# Patient Record
Sex: FEMALE | Race: WHITE | Employment: OTHER | URBAN - METROPOLITAN AREA
[De-identification: names, ages, dates, MRNs, and addresses within clinical notes are randomized per-mention and may not be internally consistent; named-entity substitution may affect disease eponyms.]

---

## 2017-06-09 PROBLEM — C50.919 BREAST CANCER (HCC): Status: ACTIVE | Noted: 2017-06-09

## 2017-06-09 PROBLEM — R42 DIZZINESS: Status: ACTIVE | Noted: 2017-06-09

## 2018-05-04 ENCOUNTER — HOSPITAL ENCOUNTER (OUTPATIENT)
Age: 63
Discharge: HOME | End: 2018-05-04
Payer: COMMERCIAL

## 2018-05-04 DIAGNOSIS — C50.912 STAGE 1 BREAST CANCER, ER+, LEFT (HCC): Primary | ICD-10-CM

## 2018-05-04 DIAGNOSIS — Z17.0 STAGE 1 BREAST CANCER, ER+, LEFT (HCC): ICD-10-CM

## 2018-05-04 DIAGNOSIS — Z17.0 STAGE 1 BREAST CANCER, ER+, LEFT (HCC): Primary | ICD-10-CM

## 2018-05-04 DIAGNOSIS — C50.912 STAGE 1 BREAST CANCER, ER+, LEFT (HCC): ICD-10-CM

## 2018-05-04 LAB
ALBUMIN SERPL-MCNC: 4.1 G/DL (ref 3.5–5.2)
ALP BLD-CCNC: 55 U/L (ref 35–104)
ALT SERPL-CCNC: 18 U/L (ref 0–32)
ANION GAP SERPL CALCULATED.3IONS-SCNC: 11 MMOL/L (ref 7–16)
AST SERPL-CCNC: 21 U/L (ref 0–31)
BASOPHILS ABSOLUTE: 0.05 E9/L (ref 0–0.2)
BASOPHILS RELATIVE PERCENT: 0.8 % (ref 0–2)
BILIRUB SERPL-MCNC: 0.6 MG/DL (ref 0–1.2)
BUN BLDV-MCNC: 16 MG/DL (ref 8–23)
CALCIUM SERPL-MCNC: 9.1 MG/DL (ref 8.6–10.2)
CHLORIDE BLD-SCNC: 103 MMOL/L (ref 98–107)
CHOLESTEROL, TOTAL: 235 MG/DL (ref 0–199)
CO2: 27 MMOL/L (ref 22–29)
CREAT SERPL-MCNC: 0.8 MG/DL (ref 0.5–1)
EOSINOPHILS ABSOLUTE: 0.16 E9/L (ref 0.05–0.5)
EOSINOPHILS RELATIVE PERCENT: 2.7 % (ref 0–6)
GFR AFRICAN AMERICAN: >60
GFR NON-AFRICAN AMERICAN: >60 ML/MIN/1.73
GLUCOSE BLD-MCNC: 91 MG/DL (ref 74–109)
HCT VFR BLD CALC: 39 % (ref 34–48)
HDLC SERPL-MCNC: 79 MG/DL
HEMOGLOBIN: 13 G/DL (ref 11.5–15.5)
IMMATURE GRANULOCYTES #: 0.02 E9/L
IMMATURE GRANULOCYTES %: 0.3 % (ref 0–5)
LDL CHOLESTEROL CALCULATED: 140 MG/DL (ref 0–99)
LYMPHOCYTES ABSOLUTE: 1.24 E9/L (ref 1.5–4)
LYMPHOCYTES RELATIVE PERCENT: 20.7 % (ref 20–42)
MCH RBC QN AUTO: 31 PG (ref 26–35)
MCHC RBC AUTO-ENTMCNC: 33.3 % (ref 32–34.5)
MCV RBC AUTO: 92.9 FL (ref 80–99.9)
MONOCYTES ABSOLUTE: 0.54 E9/L (ref 0.1–0.95)
MONOCYTES RELATIVE PERCENT: 9 % (ref 2–12)
NEUTROPHILS ABSOLUTE: 3.97 E9/L (ref 1.8–7.3)
NEUTROPHILS RELATIVE PERCENT: 66.5 % (ref 43–80)
PDW BLD-RTO: 12.3 FL (ref 11.5–15)
PLATELET # BLD: 193 E9/L (ref 130–450)
PMV BLD AUTO: 10.5 FL (ref 7–12)
POTASSIUM SERPL-SCNC: 4 MMOL/L (ref 3.5–5)
RBC # BLD: 4.2 E12/L (ref 3.5–5.5)
SODIUM BLD-SCNC: 141 MMOL/L (ref 132–146)
T4 TOTAL: 6.9 MCG/DL (ref 4.5–11.7)
TOTAL PROTEIN: 6.8 G/DL (ref 6.4–8.3)
TRIGL SERPL-MCNC: 78 MG/DL (ref 0–149)
TSH SERPL DL<=0.05 MIU/L-ACNC: 1.15 UIU/ML (ref 0.27–4.2)
VITAMIN D 25-HYDROXY: 36 NG/ML (ref 30–100)
VLDLC SERPL CALC-MCNC: 16 MG/DL
WBC # BLD: 6 E9/L (ref 4.5–11.5)

## 2018-05-04 PROCEDURE — 85025 COMPLETE CBC W/AUTO DIFF WBC: CPT

## 2018-05-04 PROCEDURE — 80061 LIPID PANEL: CPT

## 2018-05-04 PROCEDURE — 84443 ASSAY THYROID STIM HORMONE: CPT

## 2018-05-04 PROCEDURE — 36415 COLL VENOUS BLD VENIPUNCTURE: CPT

## 2018-05-04 PROCEDURE — 82306 VITAMIN D 25 HYDROXY: CPT

## 2018-05-04 PROCEDURE — 80053 COMPREHEN METABOLIC PANEL: CPT

## 2018-05-04 PROCEDURE — 84436 ASSAY OF TOTAL THYROXINE: CPT

## 2018-05-04 PROCEDURE — 86300 IMMUNOASSAY TUMOR CA 15-3: CPT

## 2018-05-05 LAB — CA 15-3: 10 U/ML (ref 0–31)

## 2018-05-15 DIAGNOSIS — I72.8 SPLENIC ARTERY ANEURYSM (HCC): Primary | ICD-10-CM

## 2018-05-16 ENCOUNTER — OFFICE VISIT (OUTPATIENT)
Dept: ONCOLOGY | Age: 63
End: 2018-05-16
Payer: COMMERCIAL

## 2018-05-16 ENCOUNTER — HOSPITAL ENCOUNTER (OUTPATIENT)
Dept: INFUSION THERAPY | Age: 63
Discharge: HOME | End: 2018-05-16
Payer: COMMERCIAL

## 2018-05-16 VITALS
HEIGHT: 63 IN | HEART RATE: 55 BPM | BODY MASS INDEX: 30.72 KG/M2 | TEMPERATURE: 97.8 F | WEIGHT: 173.4 LBS | RESPIRATION RATE: 16 BRPM | DIASTOLIC BLOOD PRESSURE: 75 MMHG | SYSTOLIC BLOOD PRESSURE: 157 MMHG

## 2018-05-16 DIAGNOSIS — C50.912 STAGE 1 BREAST CANCER, ER+, LEFT (HCC): Primary | ICD-10-CM

## 2018-05-16 DIAGNOSIS — Z17.0 STAGE 1 BREAST CANCER, ER+, LEFT (HCC): Primary | ICD-10-CM

## 2018-05-16 PROCEDURE — 99213 OFFICE O/P EST LOW 20 MIN: CPT | Performed by: INTERNAL MEDICINE

## 2018-05-19 ENCOUNTER — HOSPITAL ENCOUNTER (OUTPATIENT)
Dept: CT IMAGING | Age: 63
Discharge: HOME | End: 2018-05-19
Payer: COMMERCIAL

## 2018-05-19 DIAGNOSIS — I72.8 SPLENIC ARTERY ANEURYSM (HCC): ICD-10-CM

## 2018-05-19 PROCEDURE — 74150 CT ABDOMEN W/O CONTRAST: CPT

## 2018-05-22 ENCOUNTER — OFFICE VISIT (OUTPATIENT)
Dept: VASCULAR SURGERY | Age: 63
End: 2018-05-22
Payer: COMMERCIAL

## 2018-05-22 DIAGNOSIS — I72.8 SPLENIC ARTERY ANEURYSM (HCC): Primary | ICD-10-CM

## 2018-05-22 PROCEDURE — 99213 OFFICE O/P EST LOW 20 MIN: CPT | Performed by: SURGERY

## 2018-05-22 RX ORDER — FUROSEMIDE 20 MG/1
20 TABLET ORAL PRN
COMMUNITY
Start: 2018-05-04 | End: 2021-12-07 | Stop reason: SDUPTHER

## 2018-06-12 ENCOUNTER — HOSPITAL ENCOUNTER (OUTPATIENT)
Dept: NON INVASIVE DIAGNOSTICS | Age: 63
Discharge: HOME | End: 2018-06-12
Payer: COMMERCIAL

## 2018-06-12 LAB
LV EF: 55 %
LVEF MODALITY: NORMAL

## 2018-06-12 PROCEDURE — 93306 TTE W/DOPPLER COMPLETE: CPT

## 2018-07-02 ENCOUNTER — HOSPITAL ENCOUNTER (OUTPATIENT)
Age: 63
Discharge: HOME | End: 2018-07-02
Payer: COMMERCIAL

## 2018-07-02 LAB
ANION GAP SERPL CALCULATED.3IONS-SCNC: 8 MMOL/L (ref 7–16)
BUN BLDV-MCNC: 18 MG/DL (ref 8–23)
CALCIUM SERPL-MCNC: 8.7 MG/DL (ref 8.6–10.2)
CHLORIDE BLD-SCNC: 105 MMOL/L (ref 98–107)
CO2: 28 MMOL/L (ref 22–29)
CREAT SERPL-MCNC: 0.8 MG/DL (ref 0.5–1)
GFR AFRICAN AMERICAN: >60
GFR NON-AFRICAN AMERICAN: >60 ML/MIN/1.73
GLUCOSE BLD-MCNC: 100 MG/DL (ref 74–109)
POTASSIUM SERPL-SCNC: 4.1 MMOL/L (ref 3.5–5)
SODIUM BLD-SCNC: 141 MMOL/L (ref 132–146)

## 2018-07-02 PROCEDURE — 36415 COLL VENOUS BLD VENIPUNCTURE: CPT

## 2018-07-02 PROCEDURE — 80048 BASIC METABOLIC PNL TOTAL CA: CPT

## 2018-11-15 ENCOUNTER — HOSPITAL ENCOUNTER (OUTPATIENT)
Age: 63
Discharge: HOME | End: 2018-11-15
Payer: COMMERCIAL

## 2018-11-15 LAB
ALBUMIN SERPL-MCNC: 4.3 G/DL (ref 3.5–5.2)
ALP BLD-CCNC: 50 U/L (ref 35–104)
ALT SERPL-CCNC: 15 U/L (ref 0–32)
ANION GAP SERPL CALCULATED.3IONS-SCNC: 12 MMOL/L (ref 7–16)
AST SERPL-CCNC: 20 U/L (ref 0–31)
BILIRUB SERPL-MCNC: 0.7 MG/DL (ref 0–1.2)
BUN BLDV-MCNC: 18 MG/DL (ref 8–23)
CALCIUM SERPL-MCNC: 9.1 MG/DL (ref 8.6–10.2)
CHLORIDE BLD-SCNC: 103 MMOL/L (ref 98–107)
CHOLESTEROL, TOTAL: 274 MG/DL (ref 0–199)
CO2: 26 MMOL/L (ref 22–29)
CREAT SERPL-MCNC: 0.8 MG/DL (ref 0.5–1)
GFR AFRICAN AMERICAN: >60
GFR NON-AFRICAN AMERICAN: >60 ML/MIN/1.73
GLUCOSE BLD-MCNC: 94 MG/DL (ref 74–99)
HCT VFR BLD CALC: 38 % (ref 34–48)
HDLC SERPL-MCNC: 87 MG/DL
HEMOGLOBIN: 13 G/DL (ref 11.5–15.5)
LDL CHOLESTEROL CALCULATED: 175 MG/DL (ref 0–99)
MCH RBC QN AUTO: 31.3 PG (ref 26–35)
MCHC RBC AUTO-ENTMCNC: 34.2 % (ref 32–34.5)
MCV RBC AUTO: 91.6 FL (ref 80–99.9)
PDW BLD-RTO: 12.2 FL (ref 11.5–15)
PLATELET # BLD: 198 E9/L (ref 130–450)
PMV BLD AUTO: 10.5 FL (ref 7–12)
POTASSIUM SERPL-SCNC: 4.3 MMOL/L (ref 3.5–5)
RBC # BLD: 4.15 E12/L (ref 3.5–5.5)
SODIUM BLD-SCNC: 141 MMOL/L (ref 132–146)
TOTAL PROTEIN: 7 G/DL (ref 6.4–8.3)
TRIGL SERPL-MCNC: 62 MG/DL (ref 0–149)
TSH SERPL DL<=0.05 MIU/L-ACNC: 1.21 UIU/ML (ref 0.27–4.2)
VITAMIN D 25-HYDROXY: 41 NG/ML (ref 30–100)
VLDLC SERPL CALC-MCNC: 12 MG/DL
WBC # BLD: 5.9 E9/L (ref 4.5–11.5)

## 2018-11-15 PROCEDURE — 36415 COLL VENOUS BLD VENIPUNCTURE: CPT

## 2018-11-15 PROCEDURE — 85027 COMPLETE CBC AUTOMATED: CPT

## 2018-11-15 PROCEDURE — 84443 ASSAY THYROID STIM HORMONE: CPT

## 2018-11-15 PROCEDURE — 80061 LIPID PANEL: CPT

## 2018-11-15 PROCEDURE — 82306 VITAMIN D 25 HYDROXY: CPT

## 2018-11-15 PROCEDURE — 80053 COMPREHEN METABOLIC PANEL: CPT

## 2018-12-10 ENCOUNTER — HOSPITAL ENCOUNTER (OUTPATIENT)
Dept: GENERAL RADIOLOGY | Age: 63
Discharge: HOME | End: 2018-12-12
Payer: COMMERCIAL

## 2018-12-10 DIAGNOSIS — C50.912 STAGE 1 BREAST CANCER, ER+, LEFT (HCC): ICD-10-CM

## 2018-12-10 DIAGNOSIS — Z17.0 STAGE 1 BREAST CANCER, ER+, LEFT (HCC): ICD-10-CM

## 2018-12-10 PROCEDURE — 77067 SCR MAMMO BI INCL CAD: CPT

## 2018-12-10 PROCEDURE — 77080 DXA BONE DENSITY AXIAL: CPT

## 2019-05-09 ENCOUNTER — HOSPITAL ENCOUNTER (OUTPATIENT)
Age: 64
Discharge: HOME | End: 2019-05-09
Payer: COMMERCIAL

## 2019-05-09 LAB
ALBUMIN SERPL-MCNC: 3.8 G/DL (ref 3.5–5.2)
ALP BLD-CCNC: 53 U/L (ref 35–104)
ALT SERPL-CCNC: 10 U/L (ref 0–32)
ANION GAP SERPL CALCULATED.3IONS-SCNC: 8 MMOL/L (ref 7–16)
AST SERPL-CCNC: 19 U/L (ref 0–31)
BASOPHILS ABSOLUTE: 0.05 E9/L (ref 0–0.2)
BASOPHILS RELATIVE PERCENT: 1.1 % (ref 0–2)
BILIRUB SERPL-MCNC: 0.5 MG/DL (ref 0–1.2)
BUN BLDV-MCNC: 19 MG/DL (ref 8–23)
CALCIUM SERPL-MCNC: 9 MG/DL (ref 8.6–10.2)
CHLORIDE BLD-SCNC: 105 MMOL/L (ref 98–107)
CHOLESTEROL, TOTAL: 237 MG/DL (ref 0–199)
CO2: 28 MMOL/L (ref 22–29)
CREAT SERPL-MCNC: 0.9 MG/DL (ref 0.5–1)
EOSINOPHILS ABSOLUTE: 0.15 E9/L (ref 0.05–0.5)
EOSINOPHILS RELATIVE PERCENT: 3.2 % (ref 0–6)
GFR AFRICAN AMERICAN: >60
GFR NON-AFRICAN AMERICAN: >60 ML/MIN/1.73
GLUCOSE BLD-MCNC: 108 MG/DL (ref 74–99)
HCT VFR BLD CALC: 40 % (ref 34–48)
HDLC SERPL-MCNC: 74 MG/DL
HEMOGLOBIN: 13.2 G/DL (ref 11.5–15.5)
IMMATURE GRANULOCYTES #: 0.02 E9/L
IMMATURE GRANULOCYTES %: 0.4 % (ref 0–5)
LDL CHOLESTEROL CALCULATED: 152 MG/DL (ref 0–99)
LYMPHOCYTES ABSOLUTE: 1.17 E9/L (ref 1.5–4)
LYMPHOCYTES RELATIVE PERCENT: 25.3 % (ref 20–42)
MCH RBC QN AUTO: 30.6 PG (ref 26–35)
MCHC RBC AUTO-ENTMCNC: 33 % (ref 32–34.5)
MCV RBC AUTO: 92.8 FL (ref 80–99.9)
MONOCYTES ABSOLUTE: 0.44 E9/L (ref 0.1–0.95)
MONOCYTES RELATIVE PERCENT: 9.5 % (ref 2–12)
NEUTROPHILS ABSOLUTE: 2.8 E9/L (ref 1.8–7.3)
NEUTROPHILS RELATIVE PERCENT: 60.5 % (ref 43–80)
PDW BLD-RTO: 12.5 FL (ref 11.5–15)
PLATELET # BLD: 172 E9/L (ref 130–450)
PMV BLD AUTO: 10.8 FL (ref 7–12)
POTASSIUM SERPL-SCNC: 4.8 MMOL/L (ref 3.5–5)
RBC # BLD: 4.31 E12/L (ref 3.5–5.5)
SODIUM BLD-SCNC: 141 MMOL/L (ref 132–146)
TOTAL PROTEIN: 6.4 G/DL (ref 6.4–8.3)
TRIGL SERPL-MCNC: 55 MG/DL (ref 0–149)
TSH SERPL DL<=0.05 MIU/L-ACNC: 1.17 UIU/ML (ref 0.27–4.2)
VITAMIN D 25-HYDROXY: 40 NG/ML (ref 30–100)
VLDLC SERPL CALC-MCNC: 11 MG/DL
WBC # BLD: 4.6 E9/L (ref 4.5–11.5)

## 2019-05-09 PROCEDURE — 80061 LIPID PANEL: CPT

## 2019-05-09 PROCEDURE — 82306 VITAMIN D 25 HYDROXY: CPT

## 2019-05-09 PROCEDURE — 84443 ASSAY THYROID STIM HORMONE: CPT

## 2019-05-09 PROCEDURE — 80053 COMPREHEN METABOLIC PANEL: CPT

## 2019-05-09 PROCEDURE — 85025 COMPLETE CBC W/AUTO DIFF WBC: CPT

## 2019-05-09 PROCEDURE — 36415 COLL VENOUS BLD VENIPUNCTURE: CPT

## 2019-05-17 NOTE — PROGRESS NOTES
81 MG EC tablet Take 81 mg by mouth daily    Historical Provider, MD   celecoxib (CELEBREX) 100 MG capsule Take 200 mg by mouth 2 times daily as needed. Historical Provider, MD   calcium carbonate (OSCAL) 500 MG TABS tablet Take 500 mg by mouth daily. Historical Provider, MD    Scheduled Meds:  Continuous Infusions:  PRN Meds:.        Recent Laboratory Data-     Lab Results   Component Value Date    WBC 4.6 05/09/2019    HGB 13.2 05/09/2019    HCT 40.0 05/09/2019    MCV 92.8 05/09/2019     05/09/2019    LYMPHOPCT 25.3 05/09/2019    RBC 4.31 05/09/2019    MCH 30.6 05/09/2019    MCHC 33.0 05/09/2019    RDW 12.5 05/09/2019    NEUTOPHILPCT 60.5 05/09/2019    MONOPCT 9.5 05/09/2019    BASOPCT 1.1 05/09/2019    NEUTROABS 2.80 05/09/2019    LYMPHSABS 1.17 (L) 05/09/2019    MONOSABS 0.44 05/09/2019    EOSABS 0.15 05/09/2019    BASOSABS 0.05 05/09/2019       Lab Results   Component Value Date     05/09/2019    K 4.8 05/09/2019     05/09/2019    CO2 28 05/09/2019    BUN 19 05/09/2019    CREATININE 0.9 05/09/2019    GLUCOSE 108 (H) 05/09/2019    CALCIUM 9.0 05/09/2019    PROT 6.4 05/09/2019    LABALBU 3.8 05/09/2019    BILITOT 0.5 05/09/2019    ALKPHOS 53 05/09/2019    AST 19 05/09/2019    ALT 10 05/09/2019    LABGLOM >60 05/09/2019    GFRAA >60 05/09/2019         Lab Results   Component Value Date     4.4 01/17/2011         Radiology-  BILATERAL SCREENING MAMMOGRAM:  12/10/18  IMPRESSION:   No mammographic evidence of malignancy.       Screening mammogram in 1 year is recommended.       =======================================   BI-RADS Category 1:  Negative         DEXA BONE DENSITY:  12/10/18  FINDINGS: The lumbar spine from L1-L4 demonstrates a bone mineral   density of 0.988 g/sq cm corresponding to a T score of -1.6 and a Z   score of -0.6.  According to the [address] [facility] definition   of osteoporosis and osteopenia, this is considered osteopenia.       No comparison.       The

## 2019-05-20 ENCOUNTER — OFFICE VISIT (OUTPATIENT)
Dept: ONCOLOGY | Age: 64
End: 2019-05-20
Payer: COMMERCIAL

## 2019-05-20 ENCOUNTER — HOSPITAL ENCOUNTER (OUTPATIENT)
Dept: INFUSION THERAPY | Age: 64
Discharge: HOME | End: 2019-05-20
Payer: COMMERCIAL

## 2019-05-20 VITALS
HEIGHT: 63 IN | DIASTOLIC BLOOD PRESSURE: 75 MMHG | BODY MASS INDEX: 30.44 KG/M2 | TEMPERATURE: 97.5 F | WEIGHT: 171.8 LBS | HEART RATE: 67 BPM | SYSTOLIC BLOOD PRESSURE: 155 MMHG

## 2019-05-20 DIAGNOSIS — C50.912 STAGE 1 BREAST CANCER, ER+, LEFT (HCC): Primary | ICD-10-CM

## 2019-05-20 DIAGNOSIS — Z17.0 STAGE 1 BREAST CANCER, ER+, LEFT (HCC): Primary | ICD-10-CM

## 2019-05-20 PROCEDURE — 99213 OFFICE O/P EST LOW 20 MIN: CPT | Performed by: INTERNAL MEDICINE

## 2019-05-20 RX ORDER — LISINOPRIL 2.5 MG/1
2.5 TABLET ORAL DAILY
COMMUNITY
End: 2022-01-10

## 2019-07-31 ENCOUNTER — HOSPITAL ENCOUNTER (OUTPATIENT)
Dept: NON INVASIVE DIAGNOSTICS | Age: 64
Discharge: HOME | End: 2019-07-31
Payer: COMMERCIAL

## 2019-07-31 LAB
LV EF: 68 %
LVEF MODALITY: NORMAL

## 2019-07-31 PROCEDURE — 93306 TTE W/DOPPLER COMPLETE: CPT

## 2019-11-08 DIAGNOSIS — Z79.811 USE OF ANASTROZOLE (ARIMIDEX): Primary | ICD-10-CM

## 2019-11-23 ENCOUNTER — HOSPITAL ENCOUNTER (OUTPATIENT)
Age: 64
Discharge: HOME | End: 2019-11-23
Payer: COMMERCIAL

## 2019-11-23 LAB
ALBUMIN SERPL-MCNC: 4.2 G/DL (ref 3.5–5.2)
ALP BLD-CCNC: 56 U/L (ref 35–104)
ALT SERPL-CCNC: 19 U/L (ref 0–32)
ANION GAP SERPL CALCULATED.3IONS-SCNC: 10 MMOL/L (ref 7–16)
AST SERPL-CCNC: 25 U/L (ref 0–31)
BASOPHILS ABSOLUTE: 0.04 E9/L (ref 0–0.2)
BASOPHILS RELATIVE PERCENT: 0.8 % (ref 0–2)
BILIRUB SERPL-MCNC: 0.6 MG/DL (ref 0–1.2)
BUN BLDV-MCNC: 21 MG/DL (ref 8–23)
C-REACTIVE PROTEIN: 0.5 MG/DL (ref 0–0.4)
CALCIUM SERPL-MCNC: 9.3 MG/DL (ref 8.6–10.2)
CHLORIDE BLD-SCNC: 107 MMOL/L (ref 98–107)
CHOLESTEROL, TOTAL: 225 MG/DL (ref 0–199)
CO2: 25 MMOL/L (ref 22–29)
CREAT SERPL-MCNC: 0.9 MG/DL (ref 0.5–1)
EOSINOPHILS ABSOLUTE: 0.09 E9/L (ref 0.05–0.5)
EOSINOPHILS RELATIVE PERCENT: 1.9 % (ref 0–6)
GFR AFRICAN AMERICAN: >60
GFR NON-AFRICAN AMERICAN: >60 ML/MIN/1.73
GLUCOSE BLD-MCNC: 97 MG/DL (ref 74–99)
HBA1C MFR BLD: 5.1 % (ref 4–5.6)
HCT VFR BLD CALC: 39.1 % (ref 34–48)
HDLC SERPL-MCNC: 83 MG/DL
HEMOGLOBIN: 12.8 G/DL (ref 11.5–15.5)
IMMATURE GRANULOCYTES #: 0.01 E9/L
IMMATURE GRANULOCYTES %: 0.2 % (ref 0–5)
LDL CHOLESTEROL CALCULATED: 133 MG/DL (ref 0–99)
LYMPHOCYTES ABSOLUTE: 1.07 E9/L (ref 1.5–4)
LYMPHOCYTES RELATIVE PERCENT: 22.3 % (ref 20–42)
MCH RBC QN AUTO: 31.1 PG (ref 26–35)
MCHC RBC AUTO-ENTMCNC: 32.7 % (ref 32–34.5)
MCV RBC AUTO: 94.9 FL (ref 80–99.9)
MONOCYTES ABSOLUTE: 0.52 E9/L (ref 0.1–0.95)
MONOCYTES RELATIVE PERCENT: 10.9 % (ref 2–12)
NEUTROPHILS ABSOLUTE: 3.06 E9/L (ref 1.8–7.3)
NEUTROPHILS RELATIVE PERCENT: 63.9 % (ref 43–80)
PDW BLD-RTO: 12.5 FL (ref 11.5–15)
PLATELET # BLD: 179 E9/L (ref 130–450)
PMV BLD AUTO: 10.9 FL (ref 7–12)
POTASSIUM SERPL-SCNC: 4.7 MMOL/L (ref 3.5–5)
RBC # BLD: 4.12 E12/L (ref 3.5–5.5)
RHEUMATOID FACTOR: 10 IU/ML (ref 0–13)
SEDIMENTATION RATE, ERYTHROCYTE: 9 MM/HR (ref 0–20)
SODIUM BLD-SCNC: 142 MMOL/L (ref 132–146)
TOTAL PROTEIN: 6.8 G/DL (ref 6.4–8.3)
TRIGL SERPL-MCNC: 47 MG/DL (ref 0–149)
VITAMIN D 25-HYDROXY: 41 NG/ML (ref 30–100)
VLDLC SERPL CALC-MCNC: 9 MG/DL
WBC # BLD: 4.8 E9/L (ref 4.5–11.5)

## 2019-11-23 PROCEDURE — 86431 RHEUMATOID FACTOR QUANT: CPT

## 2019-11-23 PROCEDURE — 85651 RBC SED RATE NONAUTOMATED: CPT

## 2019-11-23 PROCEDURE — 86140 C-REACTIVE PROTEIN: CPT

## 2019-11-23 PROCEDURE — 80053 COMPREHEN METABOLIC PANEL: CPT

## 2019-11-23 PROCEDURE — 86038 ANTINUCLEAR ANTIBODIES: CPT

## 2019-11-23 PROCEDURE — 80061 LIPID PANEL: CPT

## 2019-11-23 PROCEDURE — 83036 HEMOGLOBIN GLYCOSYLATED A1C: CPT

## 2019-11-23 PROCEDURE — 85025 COMPLETE CBC W/AUTO DIFF WBC: CPT

## 2019-11-23 PROCEDURE — 82306 VITAMIN D 25 HYDROXY: CPT

## 2019-11-23 PROCEDURE — 36415 COLL VENOUS BLD VENIPUNCTURE: CPT

## 2019-11-25 LAB — ANTI-NUCLEAR ANTIBODY (ANA): NEGATIVE

## 2019-12-06 ENCOUNTER — HOSPITAL ENCOUNTER (OUTPATIENT)
Dept: GENERAL RADIOLOGY | Age: 64
Discharge: HOME | End: 2019-12-08
Payer: COMMERCIAL

## 2019-12-06 DIAGNOSIS — C50.912 STAGE 1 BREAST CANCER, ER+, LEFT (HCC): ICD-10-CM

## 2019-12-06 DIAGNOSIS — Z17.0 STAGE 1 BREAST CANCER, ER+, LEFT (HCC): ICD-10-CM

## 2019-12-06 PROCEDURE — 77063 BREAST TOMOSYNTHESIS BI: CPT

## 2020-04-08 ENCOUNTER — TELEPHONE (OUTPATIENT)
Dept: VASCULAR SURGERY | Age: 65
End: 2020-04-08

## 2020-04-30 ENCOUNTER — HOSPITAL ENCOUNTER (OUTPATIENT)
Age: 65
Discharge: HOME | End: 2020-05-02
Payer: COMMERCIAL

## 2020-04-30 PROCEDURE — U0003 INFECTIOUS AGENT DETECTION BY NUCLEIC ACID (DNA OR RNA); SEVERE ACUTE RESPIRATORY SYNDROME CORONAVIRUS 2 (SARS-COV-2) (CORONAVIRUS DISEASE [COVID-19]), AMPLIFIED PROBE TECHNIQUE, MAKING USE OF HIGH THROUGHPUT TECHNOLOGIES AS DESCRIBED BY CMS-2020-01-R: HCPCS

## 2020-05-03 LAB
SARS-COV-2: NOT DETECTED
SOURCE: NORMAL

## 2020-05-07 ENCOUNTER — HOSPITAL ENCOUNTER (OUTPATIENT)
Age: 65
Discharge: HOME | End: 2020-05-09
Payer: COMMERCIAL

## 2020-05-07 PROCEDURE — U0003 INFECTIOUS AGENT DETECTION BY NUCLEIC ACID (DNA OR RNA); SEVERE ACUTE RESPIRATORY SYNDROME CORONAVIRUS 2 (SARS-COV-2) (CORONAVIRUS DISEASE [COVID-19]), AMPLIFIED PROBE TECHNIQUE, MAKING USE OF HIGH THROUGHPUT TECHNOLOGIES AS DESCRIBED BY CMS-2020-01-R: HCPCS

## 2020-05-08 LAB
SARS-COV-2: NOT DETECTED
SOURCE: NORMAL

## 2020-05-13 ENCOUNTER — TELEPHONE (OUTPATIENT)
Dept: ONCOLOGY | Age: 65
End: 2020-05-13

## 2020-05-13 ENCOUNTER — HOSPITAL ENCOUNTER (OUTPATIENT)
Dept: MAMMOGRAPHY | Age: 65
Discharge: HOME | End: 2020-05-15
Payer: COMMERCIAL

## 2020-05-13 PROCEDURE — 77080 DXA BONE DENSITY AXIAL: CPT

## 2020-05-19 ENCOUNTER — VIRTUAL VISIT (OUTPATIENT)
Dept: VASCULAR SURGERY | Age: 65
End: 2020-05-19
Payer: COMMERCIAL

## 2020-05-19 VITALS — HEIGHT: 63 IN | WEIGHT: 169 LBS | BODY MASS INDEX: 29.95 KG/M2

## 2020-05-19 PROCEDURE — 99212 OFFICE O/P EST SF 10 MIN: CPT | Performed by: PHYSICIAN ASSISTANT

## 2020-05-19 NOTE — PROGRESS NOTES
TELEPHONE VISIT    Consent:  He and/or health care decision maker is aware that that he may receive a bill for this telephone service, depending on his insurance coverage, and has provided verbal consent to proceed: Yes    Documentation:  I communicated with the patient and/or health care decision maker about splenic artery aneurysm. Details of this discussion including any medical advice provided: Pt states she is doing well overall. 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 She has no new changes in health or surgeries since last time we saw her. She denies any abd pain or back pain. We will perform her CT in august when she is able to get it. We will call her with those results and arrange fu at that time. I asked her to call sooner with any issues. I affirm this is a Patient Initiated Episode with a Patient who has not had a related appointment within my department in the past 7 days or scheduled within the next 24 hours.     Patient's location: home  Physician  location office   Other people involved in call- none      Total Time: minutes: 5-10 minutes

## 2020-05-20 ENCOUNTER — HOSPITAL ENCOUNTER (OUTPATIENT)
Age: 65
Discharge: HOME | End: 2020-05-20
Payer: COMMERCIAL

## 2020-05-20 LAB
ALBUMIN SERPL-MCNC: 4.5 G/DL (ref 3.5–5.2)
ALP BLD-CCNC: 62 U/L (ref 35–104)
ALT SERPL-CCNC: 20 U/L (ref 0–32)
ANION GAP SERPL CALCULATED.3IONS-SCNC: 9 MMOL/L (ref 7–16)
AST SERPL-CCNC: 25 U/L (ref 0–31)
BILIRUB SERPL-MCNC: 0.6 MG/DL (ref 0–1.2)
BUN BLDV-MCNC: 18 MG/DL (ref 8–23)
CALCIUM SERPL-MCNC: 9.3 MG/DL (ref 8.6–10.2)
CHLORIDE BLD-SCNC: 103 MMOL/L (ref 98–107)
CHOLESTEROL, FASTING: 272 MG/DL (ref 0–199)
CO2: 28 MMOL/L (ref 22–29)
CREAT SERPL-MCNC: 0.9 MG/DL (ref 0.5–1)
GFR AFRICAN AMERICAN: >60
GFR NON-AFRICAN AMERICAN: >60 ML/MIN/1.73
GLUCOSE FASTING: 105 MG/DL (ref 74–99)
HCT VFR BLD CALC: 43.2 % (ref 34–48)
HDLC SERPL-MCNC: 80 MG/DL
HEMOGLOBIN: 13.9 G/DL (ref 11.5–15.5)
LDL CHOLESTEROL CALCULATED: 176 MG/DL (ref 0–99)
MCH RBC QN AUTO: 30.5 PG (ref 26–35)
MCHC RBC AUTO-ENTMCNC: 32.2 % (ref 32–34.5)
MCV RBC AUTO: 94.9 FL (ref 80–99.9)
PDW BLD-RTO: 12.2 FL (ref 11.5–15)
PLATELET # BLD: 196 E9/L (ref 130–450)
PMV BLD AUTO: 10.2 FL (ref 7–12)
POTASSIUM SERPL-SCNC: 4 MMOL/L (ref 3.5–5)
RBC # BLD: 4.55 E12/L (ref 3.5–5.5)
SODIUM BLD-SCNC: 140 MMOL/L (ref 132–146)
TOTAL PROTEIN: 7.4 G/DL (ref 6.4–8.3)
TRIGLYCERIDE, FASTING: 78 MG/DL (ref 0–149)
TSH SERPL DL<=0.05 MIU/L-ACNC: 1.3 UIU/ML (ref 0.27–4.2)
VITAMIN D 25-HYDROXY: 37 NG/ML (ref 30–100)
VLDLC SERPL CALC-MCNC: 16 MG/DL
WBC # BLD: 5.7 E9/L (ref 4.5–11.5)

## 2020-05-20 PROCEDURE — 85027 COMPLETE CBC AUTOMATED: CPT

## 2020-05-20 PROCEDURE — 84443 ASSAY THYROID STIM HORMONE: CPT

## 2020-05-20 PROCEDURE — 80307 DRUG TEST PRSMV CHEM ANLYZR: CPT

## 2020-05-20 PROCEDURE — 80053 COMPREHEN METABOLIC PANEL: CPT

## 2020-05-20 PROCEDURE — 80061 LIPID PANEL: CPT

## 2020-05-20 PROCEDURE — 82306 VITAMIN D 25 HYDROXY: CPT

## 2020-05-20 PROCEDURE — 36415 COLL VENOUS BLD VENIPUNCTURE: CPT

## 2020-05-21 LAB — MISCELLANEOUS LAB TEST RESULT: NORMAL

## 2020-07-01 ENCOUNTER — HOSPITAL ENCOUNTER (OUTPATIENT)
Dept: INFUSION THERAPY | Age: 65
Discharge: HOME | End: 2020-07-01
Payer: COMMERCIAL

## 2020-07-01 ENCOUNTER — OFFICE VISIT (OUTPATIENT)
Dept: ONCOLOGY | Age: 65
End: 2020-07-01
Payer: COMMERCIAL

## 2020-07-01 VITALS
HEART RATE: 63 BPM | OXYGEN SATURATION: 95 % | HEIGHT: 63 IN | WEIGHT: 173.8 LBS | TEMPERATURE: 97.9 F | DIASTOLIC BLOOD PRESSURE: 70 MMHG | BODY MASS INDEX: 30.79 KG/M2 | SYSTOLIC BLOOD PRESSURE: 117 MMHG

## 2020-07-01 PROCEDURE — 99213 OFFICE O/P EST LOW 20 MIN: CPT | Performed by: INTERNAL MEDICINE

## 2020-07-01 NOTE — PROGRESS NOTES
[address] [facility]        Pt Name: Audra Aranda  Birthdate: [birth year]  Date of evaluation: 7/1/2020  Primary Care Physician: Rox Prajapati DO  Reason for evaluation:   Chief Complaint   Patient presents with    Breast Cancer     Left  ER+    1 Year Follow Up        Subjective:  Here for yearly follow up and results of mammogram and DEXA scan. Feels well today. No c/o's. She will be retiring in August.        OBJECTIVE:  VITALS:  height is 5' 3\" (1.6 m) and weight is 173 lb 12.8 oz (78.8 kg). Her temporal temperature is 97.9 °F (36.6 °C). Her blood pressure is 117/70 and her pulse is 63. Her oxygen saturation is 95%. Physical Exam:  Performance Status: 0  Well developed, well nourished female  EYES: sclera non-icteric. ENT: oropharynx clear. BREASTS: .Mild firmness along scar in Left breast,no lumps or axillary nodes. NECK: No lymphadenopathy. HEART: Regular rate and rhthym. LUNGS: Clear. ABDOMEN: Soft, nontender. No ascites. No mass or organomegaly. EXTREMITIES: without clubbing, cyanosis, or edema. NEUROLOGIC: No focal deficits. SKIN: No Rash. Medications  Prior to Admission medications    Medication Sig Start Date End Date Taking?  Authorizing Provider   lisinopril (PRINIVIL;ZESTRIL) 2.5 MG tablet Take 2.5 mg by mouth daily    Historical Provider, MD   furosemide (LASIX) 20 MG tablet Take 20 mg by mouth as needed 5/4/18   Historical Provider, MD   metoprolol tartrate (LOPRESSOR) 25 MG tablet Take 25 mg by mouth 2 times daily 1/22/18 5/16/24  Historical Provider, MD   Cholecalciferol (VITAMIN D) 2000 UNITS CAPS capsule Take 1 capsule by mouth daily as needed    Historical Provider, MD   Selenium 200 MCG CAPS Take 200 mg by mouth 2 times daily    Historical Provider, MD   Lutein 40 MG CAPS Take 40 mg by mouth daily    Historical Provider, MD   Multiple Vitamins-Minerals (OCUVITE ADULT 50+ PO) Take 1 tablet by mouth daily    Historical Provider, MD   aspirin EC 81 MG EC tablet Take 81 mg by mouth daily    Historical Provider, MD   celecoxib (CELEBREX) 100 MG capsule Take 200 mg by mouth 2 times daily as needed. Historical Provider, MD   calcium carbonate (OSCAL) 500 MG TABS tablet Take 500 mg by mouth daily.       Historical Provider, MD    Scheduled Meds:  Continuous Infusions:  PRN Meds:.        Recent Laboratory Data-     Lab Results   Component Value Date    WBC 5.7 05/20/2020    HGB 13.9 05/20/2020    HCT 43.2 05/20/2020    MCV 94.9 05/20/2020     05/20/2020    LYMPHOPCT 22.3 11/23/2019    RBC 4.55 05/20/2020    MCH 30.5 05/20/2020    MCHC 32.2 05/20/2020    RDW 12.2 05/20/2020    NEUTOPHILPCT 63.9 11/23/2019    MONOPCT 10.9 11/23/2019    BASOPCT 0.8 11/23/2019    NEUTROABS 3.06 11/23/2019    LYMPHSABS 1.07 (L) 11/23/2019    MONOSABS 0.52 11/23/2019    EOSABS 0.09 11/23/2019    BASOSABS 0.04 11/23/2019       Lab Results   Component Value Date     05/20/2020    K 4.0 05/20/2020     05/20/2020    CO2 28 05/20/2020    BUN 18 05/20/2020    CREATININE 0.9 05/20/2020    GLUCOSE 97 11/23/2019    CALCIUM 9.3 05/20/2020    PROT 7.4 05/20/2020    LABALBU 4.5 05/20/2020    BILITOT 0.6 05/20/2020    ALKPHOS 62 05/20/2020    AST 25 05/20/2020    ALT 20 05/20/2020    LABGLOM >60 05/20/2020    GFRAA >60 05/20/2020         Lab Results   Component Value Date     4.4 01/17/2011         Radiology-  BILATERAL SCREENING MAMMOGRAM:  12/06/19  No mammographic evidence of malignancy.       Screening mammogram in 1 year is recommended.       =======================================   BI-RADS Category 1:  Negative   =======================================               DEXA BONE DENSITY:  5/13/2020  LUMBAR SPINE:       The bone mineral density in the lumbar spine including the L1-L4 levels is   measured at 0.999 g/cm2, corresponding to a T-score of -1.5 and a Z-score of   -0.3.  This is within the osteopenic range by WHO criteria. Rosie Horvath is mild   scoliosis.  Since the prior examination, there has been a 0.006 g/cm2   increase in bone mineral density (0.6%).       LEFT HIP:       The bone mineral density in the total hip is measured at 0.841 g/cm2   corresponding to a T-score of -1.3 and a Z-score of -0.4.  This is within the   osteopenic range by WHO criteria.  Since the prior examination, there has   been a 0.018 g/cm2 decrease in bone mineral density (-2.1%).     The bone mineral density of the femoral neck is measured at 0.880 g/cm2,   corresponding to a T-score of -1.1 and a Z-score of 0.1.  This is within the   osteopenic range by WHO criteria.       RIGHT HIP:       The bone mineral density in the total hip is measured at 0.755 g/cm2,   corresponding to a T-score of -2.0 and a Z-score of -1.1.  This is within the   osteopenic range by WHO criteria.  Since the prior examination, there has   been a 0.045 g/cm2 decrease in bone mineral density (-5.6%).     The bone mineral density in the femoral neck is measured at 0.759 g/cm2,   corresponding to a T-score of -2.0 and a Z-score of 0.8.  This is within the   osteopenic range by WHO criteria.           Impression   1. Osteopenia by WHO criteria. 2. WHO fracture risk assessment tool (FRAX) projects a 10-year fracture risk   of a major osteoporotic fracture at 17.1% and hip fracture at 2.6%.    The patient's 10-year fracture risk is increased if untreated.  Fracture   probability may be lower if the patient has received treatment. Jeana Gamez may   underestimate fracture probability in patients who have impaired functional   status from rheumatoid arthritis, history of frequent falls, history of   multiple fractures, severe vertebral fractures, parental history of non-hip   fragility fractures, glucocorticoid doses in excess of 7.5 mg/day or frequent   use, high dose inhaled glucocorticoids, and if the T-score of the lumbar   spine is > 1 SD lower than the femoral neck.  Clinical judgment and/or patient preferences may indicate treatment for people with 10 year fracture   probabilities above or below these levels. ASSESSMENT/PLAN :  Postmenopausal left breast cancer, stage 2, [id number] diagnosed in NOV 2009. Her primary tumor measured 1.2 cm with positive ER and AR receptors, negative HER-2/fazal, nonamplified by FISH. She had micrometastasis involving 1 sentinel node. Her sentinel node had 5 separate microscopic foci of metastasis, all greater than 0.2 mm. She received 4 cycles of adjuvant chemotherapy with Taxotere and Cytoxan completed in March 2010. She started hormonal therapy with Arimidex in April 2010 and continues to do well without evidence of disease recurrence     Completed Arimidex April 5, 2015. Follow up mammograms done in December 2015 was negative and He Bone density in April 2016 showed moderate osteopenia and she will continue on calcium and vitamin D supplements     She will continue to follow with endocrinology and neurology at [facility] regarding her Graves' disease of the left eye     Most recent mammogram in December 2018 was negative and most recent DEXA scan also done in December 2018 showed osteopenia of the lumbar spine and the femoral necks  Maintain Oscal for ostopenia . Most recent mammogram in December 2019 was negative and most recent DEXA scan also done in 2020 showed osteopenia of the lumbar spine and the femoral necks  Maintain Oscal for ostopenia . Manfred Acuna She will have follow-up mammogram in December 2020    Elizabeth Colón. Pelon Chase M.D., F.A.C.P.   Electronically signed 7/1/2020 at 7:38 AM

## 2020-08-19 ENCOUNTER — TELEPHONE (OUTPATIENT)
Dept: VASCULAR SURGERY | Age: 65
End: 2020-08-19

## 2020-08-27 ENCOUNTER — TELEPHONE (OUTPATIENT)
Dept: VASCULAR SURGERY | Age: 65
End: 2020-08-27

## 2020-08-27 NOTE — TELEPHONE ENCOUNTER
Pt phoned regarding scheduling CT chest.  Medicare is not effective as her primary coverage until 9/1/20 and she prefers to wait until then to schedule. She will call back with her Medicare ID #.

## 2020-08-28 LAB
ALBUMIN SERPL-MCNC: NORMAL G/DL
ALP BLD-CCNC: NORMAL U/L
ALT SERPL-CCNC: NORMAL U/L
ANION GAP SERPL CALCULATED.3IONS-SCNC: NORMAL MMOL/L
AST SERPL-CCNC: NORMAL U/L
BILIRUB SERPL-MCNC: NORMAL MG/DL
BUN BLDV-MCNC: NORMAL MG/DL
CALCIUM SERPL-MCNC: NORMAL MG/DL
CHLORIDE BLD-SCNC: NORMAL MMOL/L
CHOLESTEROL, TOTAL: 262 MG/DL
CHOLESTEROL/HDL RATIO: ABNORMAL
CO2: NORMAL
CREAT SERPL-MCNC: NORMAL MG/DL
GFR CALCULATED: NORMAL
GLUCOSE BLD-MCNC: NORMAL MG/DL
HDLC SERPL-MCNC: 69 MG/DL (ref 35–70)
LDL CHOLESTEROL CALCULATED: 173 MG/DL (ref 0–160)
NONHDLC SERPL-MCNC: ABNORMAL MG/DL
POTASSIUM SERPL-SCNC: NORMAL MMOL/L
SODIUM BLD-SCNC: NORMAL MMOL/L
TOTAL PROTEIN: NORMAL
TRIGL SERPL-MCNC: 101 MG/DL
VLDLC SERPL CALC-MCNC: 20 MG/DL

## 2020-09-10 ENCOUNTER — TELEPHONE (OUTPATIENT)
Dept: VASCULAR SURGERY | Age: 65
End: 2020-09-10

## 2020-09-10 NOTE — TELEPHONE ENCOUNTER
Notified patient of CT scan at [facility] on Monday, 9-14-20 at 3:45 pm.  [place] at 3:15 pm.

## 2020-09-14 ENCOUNTER — HOSPITAL ENCOUNTER (OUTPATIENT)
Dept: CT IMAGING | Age: 65
Discharge: HOME | End: 2020-09-14
Payer: MEDICARE

## 2020-09-14 PROCEDURE — 74150 CT ABDOMEN W/O CONTRAST: CPT

## 2020-10-06 ENCOUNTER — TELEPHONE (OUTPATIENT)
Dept: VASCULAR SURGERY | Age: 65
End: 2020-10-06

## 2020-10-06 NOTE — TELEPHONE ENCOUNTER
Left message on patient's voicemail regarding CAT scan. No change in splenic artery aneurysm. Will need repeat CAT scan in 2 years.

## 2020-12-04 LAB
ALBUMIN SERPL-MCNC: NORMAL G/DL
ALP BLD-CCNC: NORMAL U/L
ALT SERPL-CCNC: NORMAL U/L
ANION GAP SERPL CALCULATED.3IONS-SCNC: NORMAL MMOL/L
AST SERPL-CCNC: NORMAL U/L
BILIRUB SERPL-MCNC: NORMAL MG/DL
BUN BLDV-MCNC: NORMAL MG/DL
CALCIUM SERPL-MCNC: NORMAL MG/DL
CHLORIDE BLD-SCNC: NORMAL MMOL/L
CHOLESTEROL, TOTAL: 269 MG/DL
CHOLESTEROL/HDL RATIO: ABNORMAL
CO2: NORMAL
CREAT SERPL-MCNC: NORMAL MG/DL
GFR CALCULATED: NORMAL
GLUCOSE BLD-MCNC: NORMAL MG/DL
HDLC SERPL-MCNC: 82 MG/DL (ref 35–70)
LDL CHOLESTEROL CALCULATED: 173 MG/DL (ref 0–160)
NONHDLC SERPL-MCNC: ABNORMAL MG/DL
POTASSIUM SERPL-SCNC: NORMAL MMOL/L
SODIUM BLD-SCNC: NORMAL MMOL/L
TOTAL PROTEIN: NORMAL
TRIGL SERPL-MCNC: 70 MG/DL
VLDLC SERPL CALC-MCNC: 14 MG/DL

## 2020-12-16 ENCOUNTER — HOSPITAL ENCOUNTER (OUTPATIENT)
Dept: GENERAL RADIOLOGY | Age: 65
Discharge: HOME | End: 2020-12-18
Payer: MEDICARE

## 2020-12-16 PROCEDURE — 77063 BREAST TOMOSYNTHESIS BI: CPT

## 2021-05-20 VITALS
BODY MASS INDEX: 29.41 KG/M2 | TEMPERATURE: 98.1 F | WEIGHT: 166 LBS | SYSTOLIC BLOOD PRESSURE: 120 MMHG | DIASTOLIC BLOOD PRESSURE: 80 MMHG

## 2021-05-20 RX ORDER — METOPROLOL SUCCINATE 25 MG/1
25 TABLET, EXTENDED RELEASE ORAL DAILY
COMMUNITY
End: 2021-11-03

## 2021-06-30 ENCOUNTER — TELEPHONE (OUTPATIENT)
Dept: INFUSION THERAPY | Age: 66
End: 2021-06-30

## 2021-07-30 NOTE — PROGRESS NOTES
[facility]. [facility]        Pt Name: Pam Pink  Birthdate: [birth year]  Date of evaluation: 7/30/2021  Primary Care Physician: Almeta Crigler, DO  Reason for evaluation:   Chief Complaint   Patient presents with    Breast Cancer     Left  ER+    1 Year Follow Up        Subjective:  Here for yearly follow up and results of mammogram.  Feels well today. No c/o's. Reports occasional dyspnea on exertion in relation to her moderate aortic regurgitation        OBJECTIVE:  VITALS:  height is 5' 3\" (1.6 m) and weight is 167 lb 3.2 oz (75.8 kg). Her temporal temperature is 97.6 °F (36.4 °C). Her blood pressure is 135/62 and her pulse is 62. Her oxygen saturation is 96%. Physical Exam:  Performance Status: 0  Well developed, well nourished female  EYES: sclera non-icteric. ENT: oropharynx clear. BREASTS:  Mild firmness along scar in Left breast,no lumps or axillary nodes. NECK: No lymphadenopathy. HEART: Regular rate and rhthym. LUNGS: Clear. ABDOMEN: Soft, nontender. No ascites. No mass or organomegaly. EXTREMITIES: without clubbing, cyanosis, or edema. NEUROLOGIC: No focal deficits. SKIN: No Rash. Medications  Prior to Admission medications    Medication Sig Start Date End Date Taking?  Authorizing Provider   VITAMIN D PO Take by mouth    Historical Provider, MD   metoprolol succinate (TOPROL XL) 25 MG extended release tablet Take 25 mg by mouth daily    Historical Provider, MD   lisinopril (PRINIVIL;ZESTRIL) 2.5 MG tablet Take 2.5 mg by mouth daily    Historical Provider, MD   furosemide (LASIX) 20 MG tablet Take 20 mg by mouth as needed 5/4/18   Historical Provider, MD   Cholecalciferol (VITAMIN D) 2000 UNITS CAPS capsule Take 1 capsule by mouth daily as needed    Historical Provider, MD   Selenium 200 MCG CAPS Take 200 mg by mouth 2 times daily    Historical Provider, MD   Lutein 40 MG CAPS Take 40 mg by mouth daily    Historical Provider, MD Multiple Vitamins-Minerals (OCUVITE ADULT 50+ PO) Take 1 tablet by mouth daily    Historical Provider, MD   aspirin EC 81 MG EC tablet Take 81 mg by mouth daily    Historical Provider, MD   celecoxib (CELEBREX) 100 MG capsule Take 200 mg by mouth 2 times daily as needed. Historical Provider, MD   calcium carbonate (OSCAL) 500 MG TABS tablet Take 500 mg by mouth daily. Historical Provider, MD    Scheduled Meds:  Continuous Infusions:  PRN Meds:.        Recent Laboratory Data-     Lab Results   Component Value Date    WBC 5.7 05/20/2020    HGB 13.9 05/20/2020    HCT 43.2 05/20/2020    MCV 94.9 05/20/2020     05/20/2020    LYMPHOPCT 22.3 11/23/2019    RBC 4.55 05/20/2020    MCH 30.5 05/20/2020    MCHC 32.2 05/20/2020    RDW 12.2 05/20/2020    NEUTOPHILPCT 63.9 11/23/2019    MONOPCT 10.9 11/23/2019    BASOPCT 0.8 11/23/2019    NEUTROABS 3.06 11/23/2019    LYMPHSABS 1.07 (L) 11/23/2019    MONOSABS 0.52 11/23/2019    EOSABS 0.09 11/23/2019    BASOSABS 0.04 11/23/2019       Lab Results   Component Value Date     05/20/2020    K 4.0 05/20/2020     05/20/2020    CO2 28 05/20/2020    BUN 18 05/20/2020    CREATININE 0.9 05/20/2020    GLUCOSE 97 11/23/2019    CALCIUM 9.3 05/20/2020    PROT 7.4 05/20/2020    LABALBU 4.5 05/20/2020    BILITOT 0.6 05/20/2020    ALKPHOS 62 05/20/2020    AST 25 05/20/2020    ALT 20 05/20/2020    LABGLOM >60 05/20/2020    GFRAA >60 05/20/2020         Lab Results   Component Value Date     4.4 01/17/2011          Ref.  Range 4/25/2016 07:25 5/5/2017 07:04 5/25/2017 11:17 5/4/2018 09:24 11/23/2019 09:01   CA 15-3 Latest Ref Range: 0 - 31 U/mL 10 12  10            Radiology-  BILATERAL SCREENING MAMMOGRAM:  12/16/20  MAMMOGRAM FINDINGS:   Finding 1:   There is a stable post-surgical scar seen in the left breast. Finding remains unchanged from the prior study.       No suspicious masses, areas of suspicious architectural distortion, suspicious calcifications, or additional suspicious findings are identified.       IMPRESSION:   Stable post-surgical scar in the left breast is benign.       Screening mammogram in 1 year is recommended.       =======================================   BI-RADS Category 2:  Benign               DEXA BONE DENSITY:  5/13/2020  LUMBAR SPINE:       The bone mineral density in the lumbar spine including the L1-L4 levels is   measured at 0.999 g/cm2, corresponding to a T-score of -1.5 and a Z-score of   -0.3.  This is within the osteopenic range by WHO criteria.  There is mild   scoliosis.  Since the prior examination, there has been a 0.006 g/cm2   increase in bone mineral density (0.6%).       LEFT HIP:       The bone mineral density in the total hip is measured at 0.841 g/cm2   corresponding to a T-score of -1.3 and a Z-score of -0.4.  This is within the   osteopenic range by WHO criteria.  Since the prior examination, there has   been a 0.018 g/cm2 decrease in bone mineral density (-2.1%).     The bone mineral density of the femoral neck is measured at 0.880 g/cm2,   corresponding to a T-score of -1.1 and a Z-score of 0.1.  This is within the   osteopenic range by WHO criteria.       RIGHT HIP:       The bone mineral density in the total hip is measured at 0.755 g/cm2,   corresponding to a T-score of -2.0 and a Z-score of -1.1.  This is within the   osteopenic range by WHO criteria.  Since the prior examination, there has   been a 0.045 g/cm2 decrease in bone mineral density (-5.6%).     The bone mineral density in the femoral neck is measured at 0.759 g/cm2,   corresponding to a T-score of -2.0 and a Z-score of 0.8.  This is within the   osteopenic range by WHO criteria.           Impression   1. Osteopenia by WHO criteria. 2. WHO fracture risk assessment tool (FRAX) projects a 10-year fracture risk   of a major osteoporotic fracture at 17.1% and hip fracture at 2.6%.    The patient's 10-year fracture risk is increased if untreated. Sharlene Daigle probability may be lower if the patient has received treatment.  FRAX may   underestimate fracture probability in patients who have impaired functional   status from rheumatoid arthritis, history of frequent falls, history of   multiple fractures, severe vertebral fractures, parental history of non-hip   fragility fractures, glucocorticoid doses in excess of 7.5 mg/day or frequent   use, high dose inhaled glucocorticoids, and if the T-score of the lumbar   spine is > 1 SD lower than the femoral neck.  Clinical judgment and/or   patient preferences may indicate treatment for people with 10 year fracture   probabilities above or below these levels. ASSESSMENT/PLAN :  Postmenopausal left breast cancer, stage 2, [id number] diagnosed in NOV 2009. Her primary tumor measured 1.2 cm with positive ER and AK receptors, negative HER-2/fazal, nonamplified by FISH. She had micrometastasis involving 1 sentinel node. Her sentinel node had 5 separate microscopic foci of metastasis, all greater than 0.2 mm. She received 4 cycles of adjuvant chemotherapy with Taxotere and Cytoxan completed in March 2010. She started hormonal therapy with Arimidex in April 2010 and continues to do well without evidence of disease recurrence     Completed Arimidex April 5, 2015. Follow up mammograms done in December 2015 was negative and He Bone density in April 2016 showed moderate osteopenia and she will continue on calcium and vitamin D supplements     She will continue to follow with endocrinology and neurology at [facility] regarding her Graves' disease of the left eye     Most recent mammogram in December 2018 was negative and most recent DEXA scan also done in December 2018 showed osteopenia of the lumbar spine and the femoral necks  Maintain Oscal for ostopenia .     Most recent mammogram in December 2019 was negative and most recent DEXA scan also done in 2020 showed osteopenia of the lumbar spine and the femoral necks  Maintain Oscal for

## 2021-08-02 ENCOUNTER — HOSPITAL ENCOUNTER (OUTPATIENT)
Dept: INFUSION THERAPY | Age: 66
Discharge: HOME | End: 2021-08-02
Payer: MEDICARE

## 2021-08-02 ENCOUNTER — OFFICE VISIT (OUTPATIENT)
Dept: ONCOLOGY | Age: 66
End: 2021-08-02
Payer: MEDICARE

## 2021-08-02 VITALS
WEIGHT: 167.2 LBS | BODY MASS INDEX: 29.62 KG/M2 | SYSTOLIC BLOOD PRESSURE: 135 MMHG | HEIGHT: 63 IN | TEMPERATURE: 97.6 F | DIASTOLIC BLOOD PRESSURE: 62 MMHG | OXYGEN SATURATION: 96 % | HEART RATE: 62 BPM

## 2021-08-02 DIAGNOSIS — C50.912 STAGE 1 BREAST CANCER, ER+, LEFT (HCC): ICD-10-CM

## 2021-08-02 DIAGNOSIS — Z17.0 STAGE 1 BREAST CANCER, ER+, LEFT (HCC): Primary | ICD-10-CM

## 2021-08-02 DIAGNOSIS — C50.912 STAGE 1 BREAST CANCER, ER+, LEFT (HCC): Primary | ICD-10-CM

## 2021-08-02 DIAGNOSIS — Z17.0 STAGE 1 BREAST CANCER, ER+, LEFT (HCC): ICD-10-CM

## 2021-08-02 LAB
ALBUMIN SERPL-MCNC: 4.1 G/DL (ref 3.5–5.2)
ALP BLD-CCNC: 62 U/L (ref 35–104)
ALT SERPL-CCNC: 18 U/L (ref 0–32)
ANION GAP SERPL CALCULATED.3IONS-SCNC: 10 MMOL/L (ref 7–16)
AST SERPL-CCNC: 24 U/L (ref 0–31)
BASOPHILS ABSOLUTE: 0.04 E9/L (ref 0–0.2)
BASOPHILS RELATIVE PERCENT: 0.8 % (ref 0–2)
BILIRUB SERPL-MCNC: 0.6 MG/DL (ref 0–1.2)
BUN BLDV-MCNC: 18 MG/DL (ref 6–23)
CALCIUM SERPL-MCNC: 9.8 MG/DL (ref 8.6–10.2)
CHLORIDE BLD-SCNC: 105 MMOL/L (ref 98–107)
CO2: 26 MMOL/L (ref 22–29)
CREAT SERPL-MCNC: 1 MG/DL (ref 0.5–1)
EOSINOPHILS ABSOLUTE: 0.15 E9/L (ref 0.05–0.5)
EOSINOPHILS RELATIVE PERCENT: 2.9 % (ref 0–6)
GFR AFRICAN AMERICAN: >60
GFR NON-AFRICAN AMERICAN: 55 ML/MIN/1.73
GLUCOSE BLD-MCNC: 84 MG/DL (ref 74–99)
HCT VFR BLD CALC: 41.2 % (ref 34–48)
HEMOGLOBIN: 13.9 G/DL (ref 11.5–15.5)
IMMATURE GRANULOCYTES #: 0.02 E9/L
IMMATURE GRANULOCYTES %: 0.4 % (ref 0–5)
LYMPHOCYTES ABSOLUTE: 1.41 E9/L (ref 1.5–4)
LYMPHOCYTES RELATIVE PERCENT: 27.2 % (ref 20–42)
MCH RBC QN AUTO: 31.8 PG (ref 26–35)
MCHC RBC AUTO-ENTMCNC: 33.7 % (ref 32–34.5)
MCV RBC AUTO: 94.3 FL (ref 80–99.9)
MONOCYTES ABSOLUTE: 0.4 E9/L (ref 0.1–0.95)
MONOCYTES RELATIVE PERCENT: 7.7 % (ref 2–12)
NEUTROPHILS ABSOLUTE: 3.16 E9/L (ref 1.8–7.3)
NEUTROPHILS RELATIVE PERCENT: 61 % (ref 43–80)
PDW BLD-RTO: 12.4 FL (ref 11.5–15)
PLATELET # BLD: 189 E9/L (ref 130–450)
PMV BLD AUTO: 10.6 FL (ref 7–12)
POTASSIUM SERPL-SCNC: 4.4 MMOL/L (ref 3.5–5)
RBC # BLD: 4.37 E12/L (ref 3.5–5.5)
SODIUM BLD-SCNC: 141 MMOL/L (ref 132–146)
TOTAL PROTEIN: 6.8 G/DL (ref 6.4–8.3)
WBC # BLD: 5.2 E9/L (ref 4.5–11.5)

## 2021-08-02 PROCEDURE — 86300 IMMUNOASSAY TUMOR CA 15-3: CPT

## 2021-08-02 PROCEDURE — 85025 COMPLETE CBC W/AUTO DIFF WBC: CPT

## 2021-08-02 PROCEDURE — 36415 COLL VENOUS BLD VENIPUNCTURE: CPT

## 2021-08-02 PROCEDURE — 99213 OFFICE O/P EST LOW 20 MIN: CPT

## 2021-08-02 PROCEDURE — 80053 COMPREHEN METABOLIC PANEL: CPT

## 2021-08-02 RX ORDER — ACETAMINOPHEN, ASPIRIN AND CAFFEINE 250; 250; 65 MG/1; MG/1; MG/1
1 TABLET, FILM COATED ORAL EVERY 6 HOURS PRN
COMMUNITY

## 2021-08-02 NOTE — PROGRESS NOTES
Labs drawn peripherally. Dry dressing applied. Patient tolerated well. Specimen sent to lab. Ectopic pregnancy    Pulmonary embolism

## 2021-08-05 LAB — CA 15-3: 13 U/ML (ref 0–31)

## 2021-10-13 DIAGNOSIS — C50.912 STAGE 1 BREAST CANCER, ER+, LEFT (HCC): ICD-10-CM

## 2021-10-13 DIAGNOSIS — Z79.811 USE OF AROMATASE INHIBITORS: Primary | ICD-10-CM

## 2021-10-13 DIAGNOSIS — Z17.0 STAGE 1 BREAST CANCER, ER+, LEFT (HCC): ICD-10-CM

## 2021-12-02 ENCOUNTER — TELEPHONE (OUTPATIENT)
Dept: PRIMARY CARE CLINIC | Age: 66
End: 2021-12-02

## 2021-12-03 DIAGNOSIS — I10 ESSENTIAL HYPERTENSION: ICD-10-CM

## 2021-12-03 DIAGNOSIS — I48.0 PAF (PAROXYSMAL ATRIAL FIBRILLATION) (HCC): Primary | ICD-10-CM

## 2021-12-03 DIAGNOSIS — L63.9 ALOPECIA AREATA: ICD-10-CM

## 2021-12-03 DIAGNOSIS — E55.9 VITAMIN D DEFICIENCY: ICD-10-CM

## 2021-12-03 DIAGNOSIS — E05.00 GRAVES' ORBITOPATHY: ICD-10-CM

## 2021-12-03 DIAGNOSIS — E78.5 HYPERLIPIDEMIA, UNSPECIFIED HYPERLIPIDEMIA TYPE: ICD-10-CM

## 2021-12-03 PROBLEM — I35.1 NONRHEUMATIC AORTIC VALVE INSUFFICIENCY: Status: ACTIVE | Noted: 2018-01-02

## 2021-12-06 ENCOUNTER — HOSPITAL ENCOUNTER (OUTPATIENT)
Age: 66
Discharge: HOME | End: 2021-12-06
Payer: MEDICARE

## 2021-12-06 DIAGNOSIS — E05.00 GRAVES' ORBITOPATHY: ICD-10-CM

## 2021-12-06 DIAGNOSIS — E55.9 VITAMIN D DEFICIENCY: ICD-10-CM

## 2021-12-06 DIAGNOSIS — E78.5 HYPERLIPIDEMIA, UNSPECIFIED HYPERLIPIDEMIA TYPE: ICD-10-CM

## 2021-12-06 DIAGNOSIS — I10 ESSENTIAL HYPERTENSION: ICD-10-CM

## 2021-12-06 DIAGNOSIS — I48.0 PAF (PAROXYSMAL ATRIAL FIBRILLATION) (HCC): ICD-10-CM

## 2021-12-06 LAB
ALBUMIN SERPL-MCNC: 4 G/DL (ref 3.5–5.2)
ALP BLD-CCNC: 54 U/L (ref 35–104)
ALT SERPL-CCNC: 18 U/L (ref 0–32)
ANION GAP SERPL CALCULATED.3IONS-SCNC: 10 MMOL/L (ref 7–16)
AST SERPL-CCNC: 21 U/L (ref 0–31)
BILIRUB SERPL-MCNC: 0.5 MG/DL (ref 0–1.2)
BUN BLDV-MCNC: 20 MG/DL (ref 6–23)
CALCIUM SERPL-MCNC: 8.9 MG/DL (ref 8.6–10.2)
CHLORIDE BLD-SCNC: 105 MMOL/L (ref 98–107)
CHOLESTEROL, TOTAL: 257 MG/DL (ref 0–199)
CO2: 25 MMOL/L (ref 22–29)
CREAT SERPL-MCNC: 0.9 MG/DL (ref 0.5–1)
GFR AFRICAN AMERICAN: >60
GFR NON-AFRICAN AMERICAN: >60 ML/MIN/1.73
GLUCOSE FASTING: 95 MG/DL (ref 74–99)
HDLC SERPL-MCNC: 74 MG/DL
LDL CHOLESTEROL CALCULATED: 168 MG/DL (ref 0–99)
POTASSIUM SERPL-SCNC: 4.4 MMOL/L (ref 3.5–5)
SODIUM BLD-SCNC: 140 MMOL/L (ref 132–146)
TOTAL PROTEIN: 6.4 G/DL (ref 6.4–8.3)
TRIGL SERPL-MCNC: 75 MG/DL (ref 0–149)
TSH SERPL DL<=0.05 MIU/L-ACNC: 0.98 UIU/ML (ref 0.27–4.2)
VITAMIN D 25-HYDROXY: 49 NG/ML (ref 30–100)
VLDLC SERPL CALC-MCNC: 15 MG/DL

## 2021-12-06 PROCEDURE — 80061 LIPID PANEL: CPT

## 2021-12-06 PROCEDURE — 80053 COMPREHEN METABOLIC PANEL: CPT

## 2021-12-06 PROCEDURE — 82306 VITAMIN D 25 HYDROXY: CPT

## 2021-12-06 PROCEDURE — 36415 COLL VENOUS BLD VENIPUNCTURE: CPT

## 2021-12-06 PROCEDURE — 84443 ASSAY THYROID STIM HORMONE: CPT

## 2021-12-07 ENCOUNTER — OFFICE VISIT (OUTPATIENT)
Dept: PRIMARY CARE CLINIC | Age: 66
End: 2021-12-07
Payer: MEDICARE

## 2021-12-07 VITALS
SYSTOLIC BLOOD PRESSURE: 132 MMHG | WEIGHT: 173 LBS | HEART RATE: 62 BPM | OXYGEN SATURATION: 100 % | DIASTOLIC BLOOD PRESSURE: 84 MMHG | RESPIRATION RATE: 18 BRPM | TEMPERATURE: 97.2 F | BODY MASS INDEX: 30.65 KG/M2 | HEIGHT: 63 IN

## 2021-12-07 DIAGNOSIS — E55.9 VITAMIN D INSUFFICIENCY: ICD-10-CM

## 2021-12-07 DIAGNOSIS — E05.00 GRAVES' EYE DISEASE: ICD-10-CM

## 2021-12-07 DIAGNOSIS — E55.9 VITAMIN D DEFICIENCY: ICD-10-CM

## 2021-12-07 DIAGNOSIS — I48.0 PAF (PAROXYSMAL ATRIAL FIBRILLATION) (HCC): ICD-10-CM

## 2021-12-07 DIAGNOSIS — M65.9 SYNOVITIS OF RIGHT ANKLE: ICD-10-CM

## 2021-12-07 DIAGNOSIS — I72.8 SPLENIC ARTERY ANEURYSM (HCC): ICD-10-CM

## 2021-12-07 DIAGNOSIS — M65.9 SYNOVITIS OF RIGHT FOOT: Primary | ICD-10-CM

## 2021-12-07 DIAGNOSIS — E78.5 HYPERLIPIDEMIA, UNSPECIFIED HYPERLIPIDEMIA TYPE: ICD-10-CM

## 2021-12-07 DIAGNOSIS — L63.9 ALOPECIA AREATA: ICD-10-CM

## 2021-12-07 DIAGNOSIS — I10 ESSENTIAL HYPERTENSION: ICD-10-CM

## 2021-12-07 PROBLEM — M65.971 SYNOVITIS OF RIGHT ANKLE: Status: ACTIVE | Noted: 2021-12-07

## 2021-12-07 PROCEDURE — 1090F PRES/ABSN URINE INCON ASSESS: CPT | Performed by: FAMILY MEDICINE

## 2021-12-07 PROCEDURE — 4040F PNEUMOC VAC/ADMIN/RCVD: CPT | Performed by: FAMILY MEDICINE

## 2021-12-07 PROCEDURE — 1123F ACP DISCUSS/DSCN MKR DOCD: CPT | Performed by: FAMILY MEDICINE

## 2021-12-07 PROCEDURE — G8417 CALC BMI ABV UP PARAM F/U: HCPCS | Performed by: FAMILY MEDICINE

## 2021-12-07 PROCEDURE — G8399 PT W/DXA RESULTS DOCUMENT: HCPCS | Performed by: FAMILY MEDICINE

## 2021-12-07 PROCEDURE — G8427 DOCREV CUR MEDS BY ELIG CLIN: HCPCS | Performed by: FAMILY MEDICINE

## 2021-12-07 PROCEDURE — G8484 FLU IMMUNIZE NO ADMIN: HCPCS | Performed by: FAMILY MEDICINE

## 2021-12-07 PROCEDURE — 1036F TOBACCO NON-USER: CPT | Performed by: FAMILY MEDICINE

## 2021-12-07 PROCEDURE — 99214 OFFICE O/P EST MOD 30 MIN: CPT | Performed by: FAMILY MEDICINE

## 2021-12-07 PROCEDURE — 3017F COLORECTAL CA SCREEN DOC REV: CPT | Performed by: FAMILY MEDICINE

## 2021-12-07 RX ORDER — FUROSEMIDE 20 MG/1
20 TABLET ORAL DAILY
Qty: 90 TABLET | Refills: 1 | Status: SHIPPED | OUTPATIENT
Start: 2021-12-07

## 2021-12-07 RX ORDER — EZETIMIBE 10 MG/1
10 TABLET ORAL DAILY
Qty: 90 TABLET | Refills: 1 | Status: SHIPPED
Start: 2021-12-07 | End: 2022-06-07

## 2021-12-07 SDOH — ECONOMIC STABILITY: FOOD INSECURITY: WITHIN THE PAST 12 MONTHS, THE FOOD YOU BOUGHT JUST DIDN'T LAST AND YOU DIDN'T HAVE MONEY TO GET MORE.: NEVER TRUE

## 2021-12-07 SDOH — ECONOMIC STABILITY: FOOD INSECURITY: WITHIN THE PAST 12 MONTHS, YOU WORRIED THAT YOUR FOOD WOULD RUN OUT BEFORE YOU GOT MONEY TO BUY MORE.: NEVER TRUE

## 2021-12-07 ASSESSMENT — PATIENT HEALTH QUESTIONNAIRE - PHQ9
SUM OF ALL RESPONSES TO PHQ QUESTIONS 1-9: 0
SUM OF ALL RESPONSES TO PHQ QUESTIONS 1-9: 0
1. LITTLE INTEREST OR PLEASURE IN DOING THINGS: 0
SUM OF ALL RESPONSES TO PHQ QUESTIONS 1-9: 0
2. FEELING DOWN, DEPRESSED OR HOPELESS: 0
SUM OF ALL RESPONSES TO PHQ9 QUESTIONS 1 & 2: 0

## 2021-12-07 ASSESSMENT — SOCIAL DETERMINANTS OF HEALTH (SDOH): HOW HARD IS IT FOR YOU TO PAY FOR THE VERY BASICS LIKE FOOD, HOUSING, MEDICAL CARE, AND HEATING?: NOT HARD AT ALL

## 2021-12-07 NOTE — PROGRESS NOTES
Gisell Vaughan (:  1955) is a 77 y.o. female,Established patient, here for evaluation of the following chief complaint(s):  6 Month Follow-Up (Pt wished to discuss labs) and Other (R foot continues to bother her .)         ASSESSMENT/PLAN:  1. Synovitis of right foot  -     XR FOOT RIGHT (MIN 3 VIEWS); Future  -     XR ANKLE RIGHT (MIN 3 VIEWS); Future  -     CBC; Future  -     Comprehensive Metabolic Panel, Fasting; Future  2. Splenic artery aneurysm (HCC)  -     CBC; Future  -     Comprehensive Metabolic Panel, Fasting; Future  3. PAF (paroxysmal atrial fibrillation) (HCC)  -     CBC; Future  -     Comprehensive Metabolic Panel, Fasting; Future  4. Synovitis of right ankle  -     XR ANKLE RIGHT (MIN 3 VIEWS); Future  -     CBC; Future  -     Comprehensive Metabolic Panel, Fasting; Future  5. Graves' eye disease  -     CBC; Future  -     Comprehensive Metabolic Panel, Fasting; Future  -     TSH without Reflex; Future  6. Vitamin D insufficiency  -     Vitamin D 25 Hydroxy; Future  7. Hyperlipidemia, unspecified hyperlipidemia type  -     Lipid Panel; Future  8. Essential hypertension  9. Vitamin D deficiency  10. Alopecia areata      PLAN:    · Follow up regarding splenic artery aneurysm with Dr. Yoel Montano 2022. · Reviewed labs with patient in detail. · She was agreeable to starting a trial of Zetia 10 mg daily for hyperlipidemia. · Given a requisition for an x-ray of the right foot and ankle. · Requisition issued for recheck fasting labs in 6 months. Return in about 6 months (around 2022) for Follow up. Subjective   SUBJECTIVE/OBJECTIVE:  Patient here for 6-month follow-up and review of labs. She complains of pain over the dorsal aspect of her right foot extending into her anterior ankle onset years ago. Worse with ambulation. She has had swelling but none recently. No discoloration. Denies any history of prior trauma.   She has been unable to tolerate any statins or red yeast rice. Did not follow through with Yin Scott. Review of Systems   Constitutional: Negative for chills, fatigue and fever. HENT: Negative for congestion, ear discharge, ear pain, facial swelling, hearing loss, nosebleeds, rhinorrhea, sinus pressure and sore throat. Eyes: Negative for photophobia, pain, discharge, itching and visual disturbance. Respiratory: Negative for cough, shortness of breath and wheezing. Cardiovascular: Negative for chest pain, palpitations and leg swelling. Gastrointestinal: Negative for abdominal distention, abdominal pain, blood in stool, constipation, diarrhea, nausea and vomiting. Endocrine: Negative for polydipsia, polyphagia and polyuria. Genitourinary: Negative for difficulty urinating, dysuria, frequency, hematuria and urgency. Musculoskeletal: Positive for arthralgias (Right foot and anterior ankle) and joint swelling (Intermittent right distal forefoot). Negative for myalgias. Skin: Negative for color change and rash. Allergic/Immunologic: Negative for environmental allergies and food allergies. Neurological: Negative for dizziness, seizures, syncope, weakness, numbness and headaches. Psychiatric/Behavioral: Negative for confusion, hallucinations and suicidal ideas. The patient is not nervous/anxious.           Immunization History   Administered Date(s) Administered    COVID-19, Pfizer, PF, 30mcg/0.3mL 01/31/2021, 02/21/2021, 09/27/2021    Influenza Virus Vaccine 10/05/2017, 10/08/2018, 10/14/2019    Influenza, Quadv, adjuvanted, 65 yrs +, IM, PF (Fluad) 10/16/2020, 10/22/2021    Pneumococcal Conjugate 13-valent ([id number]) 11/11/2020    Pneumococcal Polysaccharide ([id number]) 11/12/2021    Tdap (Boostrix, Adacel) 05/16/2019         Objective   /84   Pulse 62   Temp 97.2 °F (36.2 °C)   Resp 18   Ht 5' 3\" (1.6 m)   Wt 173 lb (78.5 kg)   SpO2 100%   BMI 30.65 kg/m²   Current Outpatient Medications   Medication Sig Dispense Refill  furosemide (LASIX) 20 MG tablet Take 1 tablet by mouth daily 90 tablet 1    ezetimibe (ZETIA) 10 MG tablet Take 1 tablet by mouth daily 90 tablet 1    metoprolol succinate (TOPROL XL) 25 MG extended release tablet TAKE ONE TABLET BY MOUTH EVERY DAY 90 tablet 3    aspirin-acetaminophen-caffeine (EXCEDRIN MIGRAINE) 625-238-41 MG per tablet Take 1 tablet by mouth every 6 hours as needed for Headaches Patient takes as needed for joint pain and headaches.  VITAMIN D PO Take by mouth       lisinopril (PRINIVIL;ZESTRIL) 2.5 MG tablet Take 2.5 mg by mouth daily      Cholecalciferol (VITAMIN D) 2000 UNITS CAPS capsule Take 1 capsule by mouth daily as needed      Selenium 200 MCG CAPS Take 200 mg by mouth 2 times daily      celecoxib (CELEBREX) 100 MG capsule Take 200 mg by mouth 2 times daily as needed.  calcium carbonate (OSCAL) 500 MG TABS tablet Take 500 mg by mouth daily. No current facility-administered medications for this visit. Physical Exam  Vitals and nursing note reviewed. Constitutional:       Appearance: Normal appearance. HENT:      Head: Normocephalic and atraumatic. Right Ear: Tympanic membrane and ear canal normal.      Left Ear: Tympanic membrane and ear canal normal.      Nose: Nose normal.      Mouth/Throat:      Mouth: Mucous membranes are moist.   Eyes:      Extraocular Movements: Extraocular movements intact. Conjunctiva/sclera: Conjunctivae normal.      Pupils: Pupils are equal, round, and reactive to light. Neck:      Vascular: No carotid bruit. Cardiovascular:      Rate and Rhythm: Normal rate and regular rhythm. Pulses: Normal pulses. Heart sounds: Murmur (Grade 2/6 systolic) heard. Pulmonary:      Effort: No respiratory distress. Breath sounds: No wheezing, rhonchi or rales. Abdominal:      General: Bowel sounds are normal. There is no distension. Palpations: There is no mass. Tenderness:  There is no abdominal tenderness. There is no guarding or rebound. Musculoskeletal:         General: Tenderness (Right forefoot and anterior ankle) present. No swelling or deformity. Normal range of motion. Cervical back: Normal range of motion. Right lower leg: No edema. Left lower leg: No edema. Lymphadenopathy:      Cervical: No cervical adenopathy. Skin:     General: Skin is warm and dry. Coloration: Skin is not jaundiced. Findings: No bruising. Comments: Alopecia. Currently wearing a wig. Neurological:      General: No focal deficit present. Mental Status: She is alert and oriented to person, place, and time. Cranial Nerves: No cranial nerve deficit. Motor: No weakness. Gait: Gait normal.   Psychiatric:         Mood and Affect: Mood normal.         Behavior: Behavior normal.         Thought Content:  Thought content normal.         Judgment: Judgment normal.            CBC  WBC   Date Value Ref Range Status   08/02/2021 5.2 4.5 - 11.5 E9/L Final     RBC   Date Value Ref Range Status   08/02/2021 4.37 3.50 - 5.50 E12/L Final     Hemoglobin   Date Value Ref Range Status   08/02/2021 13.9 11.5 - 15.5 g/dL Final     Hematocrit   Date Value Ref Range Status   08/02/2021 41.2 34.0 - 48.0 % Final     MCV   Date Value Ref Range Status   08/02/2021 94.3 80.0 - 99.9 fL Final     MCH   Date Value Ref Range Status   08/02/2021 31.8 26.0 - 35.0 pg Final     MCHC   Date Value Ref Range Status   08/02/2021 33.7 32.0 - 34.5 % Final     RDW   Date Value Ref Range Status   08/02/2021 12.4 11.5 - 15.0 fL Final     Platelets   Date Value Ref Range Status   08/02/2021 189 130 - 450 E9/L Final     MPV   Date Value Ref Range Status   08/02/2021 10.6 7.0 - 12.0 fL Final     Neutrophils %   Date Value Ref Range Status   08/02/2021 61.0 43.0 - 80.0 % Final     Immature Granulocytes #   Date Value Ref Range Status   08/02/2021 0.02 E9/L Final     Immature Granulocytes %   Date Value Ref Range Status 08/02/2021 0.4 0.0 - 5.0 % Final     Lymphocytes %   Date Value Ref Range Status   08/02/2021 27.2 20.0 - 42.0 % Final     Monocytes %   Date Value Ref Range Status   08/02/2021 7.7 2.0 - 12.0 % Final     Eosinophils %   Date Value Ref Range Status   08/02/2021 2.9 0.0 - 6.0 % Final     Basophils %   Date Value Ref Range Status   08/02/2021 0.8 0.0 - 2.0 % Final     Neutrophils Absolute   Date Value Ref Range Status   08/02/2021 3.16 1.80 - 7.30 E9/L Final     Lymphocytes Absolute   Date Value Ref Range Status   08/02/2021 1.41 (L) 1.50 - 4.00 E9/L Final     Monocytes Absolute   Date Value Ref Range Status   08/02/2021 0.40 0.10 - 0.95 E9/L Final     Eosinophils Absolute   Date Value Ref Range Status   08/02/2021 0.15 0.05 - 0.50 E9/L Final     Basophils Absolute   Date Value Ref Range Status   08/02/2021 0.04 0.00 - 0.20 E9/L Final       CMP  Sodium   Date Value Ref Range Status   12/06/2021 140 132 - 146 mmol/L Final     Potassium   Date Value Ref Range Status   12/06/2021 4.4 3.5 - 5.0 mmol/L Final     Chloride   Date Value Ref Range Status   12/06/2021 105 98 - 107 mmol/L Final     CO2   Date Value Ref Range Status   12/06/2021 25 22 - 29 mmol/L Final     Anion Gap   Date Value Ref Range Status   12/06/2021 10 7 - 16 mmol/L Final     Glucose   Date Value Ref Range Status   08/02/2021 84 74 - 99 mg/dL Final   04/09/2012 109 70 - 110 mg/dL Final     BUN   Date Value Ref Range Status   12/06/2021 20 6 - 23 mg/dL Final     CREATININE   Date Value Ref Range Status   12/06/2021 0.9 0.5 - 1.0 mg/dL Final     GFR Non-   Date Value Ref Range Status   12/06/2021 >60 >=60 mL/min/1.73 Final     Comment:     Chronic Kidney Disease: less than 60 ml/min/1.73 sq.m. Kidney Failure: less than 15 ml/min/1.73 sq.m. Results valid for patients 18 years and older.        GFR    Date Value Ref Range Status   12/06/2021 >60  Final     Calcium   Date Value Ref Range Status   12/06/2021 8.9 8.6 - 10.2 mg/dL Final     Total Protein   Date Value Ref Range Status   12/06/2021 6.4 6.4 - 8.3 g/dL Final     Albumin   Date Value Ref Range Status   12/06/2021 4.0 3.5 - 5.2 g/dL Final   04/09/2012 4.4 3.2 - 4.8 g/dL Final     Total Bilirubin   Date Value Ref Range Status   12/06/2021 0.5 0.0 - 1.2 mg/dL Final     Alkaline Phosphatase   Date Value Ref Range Status   12/06/2021 54 35 - 104 U/L Final     ALT   Date Value Ref Range Status   12/06/2021 18 0 - 32 U/L Final     AST   Date Value Ref Range Status   12/06/2021 21 0 - 31 U/L Final       TSH  Lab Results   Component Value Date    TSH 0.984 12/06/2021       A1C  Lab Results   Component Value Date    LABA1C 5.1 11/23/2019       LIPID  Lab Results   Component Value Date    CHOL 257 (H) 12/06/2021    TRIG 75 12/06/2021    HDL 74 12/06/2021    LDLCALC 168 (H) 12/06/2021    LABVLDL 15 12/06/2021    VLDL 14 12/04/2020        No results found for this visit on 12/07/21. An electronic signature was used to authenticate this note.     --Daren French DO

## 2021-12-08 ENCOUNTER — HOSPITAL ENCOUNTER (OUTPATIENT)
Age: 66
Discharge: HOME | End: 2021-12-10
Payer: MEDICARE

## 2021-12-08 ENCOUNTER — HOSPITAL ENCOUNTER (OUTPATIENT)
Dept: GENERAL RADIOLOGY | Age: 66
Discharge: HOME | End: 2021-12-10
Payer: MEDICARE

## 2021-12-08 DIAGNOSIS — M65.9 SYNOVITIS OF RIGHT FOOT: ICD-10-CM

## 2021-12-08 DIAGNOSIS — M65.9 SYNOVITIS OF RIGHT ANKLE: ICD-10-CM

## 2021-12-08 PROCEDURE — 73630 X-RAY EXAM OF FOOT: CPT

## 2021-12-08 PROCEDURE — 73610 X-RAY EXAM OF ANKLE: CPT

## 2021-12-08 ASSESSMENT — ENCOUNTER SYMPTOMS
SHORTNESS OF BREATH: 0
VOMITING: 0
SINUS PRESSURE: 0
ABDOMINAL PAIN: 0
COLOR CHANGE: 0
ABDOMINAL DISTENTION: 0
WHEEZING: 0
CONSTIPATION: 0
EYE PAIN: 0
EYE DISCHARGE: 0
EYE ITCHING: 0
NAUSEA: 0
SORE THROAT: 0
BLOOD IN STOOL: 0
PHOTOPHOBIA: 0
COUGH: 0
RHINORRHEA: 0
FACIAL SWELLING: 0
DIARRHEA: 0

## 2021-12-17 ENCOUNTER — HOSPITAL ENCOUNTER (OUTPATIENT)
Dept: GENERAL RADIOLOGY | Age: 66
Discharge: HOME | End: 2021-12-19
Payer: MEDICARE

## 2021-12-17 DIAGNOSIS — Z12.31 VISIT FOR SCREENING MAMMOGRAM: ICD-10-CM

## 2021-12-17 DIAGNOSIS — Z17.0 STAGE 1 BREAST CANCER, ER+, LEFT (HCC): ICD-10-CM

## 2021-12-17 DIAGNOSIS — Z79.811 USE OF AROMATASE INHIBITORS: ICD-10-CM

## 2021-12-17 DIAGNOSIS — C50.912 STAGE 1 BREAST CANCER, ER+, LEFT (HCC): ICD-10-CM

## 2021-12-17 PROCEDURE — 77080 DXA BONE DENSITY AXIAL: CPT

## 2021-12-17 PROCEDURE — 77063 BREAST TOMOSYNTHESIS BI: CPT

## 2022-06-02 ENCOUNTER — HOSPITAL ENCOUNTER (OUTPATIENT)
Age: 67
Discharge: HOME | End: 2022-06-02
Payer: MEDICARE

## 2022-06-02 DIAGNOSIS — I48.0 PAF (PAROXYSMAL ATRIAL FIBRILLATION) (HCC): ICD-10-CM

## 2022-06-02 DIAGNOSIS — E78.5 HYPERLIPIDEMIA, UNSPECIFIED HYPERLIPIDEMIA TYPE: ICD-10-CM

## 2022-06-02 DIAGNOSIS — M65.9 SYNOVITIS OF RIGHT FOOT: ICD-10-CM

## 2022-06-02 DIAGNOSIS — M65.9 SYNOVITIS OF RIGHT ANKLE: ICD-10-CM

## 2022-06-02 DIAGNOSIS — E05.00 GRAVES' EYE DISEASE: ICD-10-CM

## 2022-06-02 DIAGNOSIS — E55.9 VITAMIN D INSUFFICIENCY: ICD-10-CM

## 2022-06-02 DIAGNOSIS — I72.8 SPLENIC ARTERY ANEURYSM (HCC): ICD-10-CM

## 2022-06-02 LAB
ALBUMIN SERPL-MCNC: 4.3 G/DL (ref 3.5–5.2)
ALP BLD-CCNC: 58 U/L (ref 35–104)
ALT SERPL-CCNC: 13 U/L (ref 0–32)
ANION GAP SERPL CALCULATED.3IONS-SCNC: 9 MMOL/L (ref 7–16)
AST SERPL-CCNC: 20 U/L (ref 0–31)
BILIRUB SERPL-MCNC: 0.6 MG/DL (ref 0–1.2)
BUN BLDV-MCNC: 21 MG/DL (ref 6–23)
CALCIUM SERPL-MCNC: 9.2 MG/DL (ref 8.6–10.2)
CHLORIDE BLD-SCNC: 106 MMOL/L (ref 98–107)
CHOLESTEROL, TOTAL: 260 MG/DL (ref 0–199)
CO2: 25 MMOL/L (ref 22–29)
CREAT SERPL-MCNC: 0.9 MG/DL (ref 0.5–1)
GFR AFRICAN AMERICAN: >60
GFR NON-AFRICAN AMERICAN: >60 ML/MIN/1.73
GLUCOSE FASTING: 98 MG/DL (ref 74–99)
HCT VFR BLD CALC: 41.9 % (ref 34–48)
HDLC SERPL-MCNC: 80 MG/DL
HEMOGLOBIN: 13.8 G/DL (ref 11.5–15.5)
LDL CHOLESTEROL CALCULATED: 162 MG/DL (ref 0–99)
MCH RBC QN AUTO: 30.8 PG (ref 26–35)
MCHC RBC AUTO-ENTMCNC: 32.9 % (ref 32–34.5)
MCV RBC AUTO: 93.5 FL (ref 80–99.9)
PDW BLD-RTO: 12.4 FL (ref 11.5–15)
PLATELET # BLD: 196 E9/L (ref 130–450)
PMV BLD AUTO: 10.3 FL (ref 7–12)
POTASSIUM SERPL-SCNC: 4.4 MMOL/L (ref 3.5–5)
RBC # BLD: 4.48 E12/L (ref 3.5–5.5)
SODIUM BLD-SCNC: 140 MMOL/L (ref 132–146)
TOTAL PROTEIN: 6.7 G/DL (ref 6.4–8.3)
TRIGL SERPL-MCNC: 89 MG/DL (ref 0–149)
TSH SERPL DL<=0.05 MIU/L-ACNC: 1.09 UIU/ML (ref 0.27–4.2)
VITAMIN D 25-HYDROXY: 48 NG/ML (ref 30–100)
VLDLC SERPL CALC-MCNC: 18 MG/DL
WBC # BLD: 5.5 E9/L (ref 4.5–11.5)

## 2022-06-02 PROCEDURE — 84443 ASSAY THYROID STIM HORMONE: CPT

## 2022-06-02 PROCEDURE — 85027 COMPLETE CBC AUTOMATED: CPT

## 2022-06-02 PROCEDURE — 80053 COMPREHEN METABOLIC PANEL: CPT

## 2022-06-02 PROCEDURE — 80061 LIPID PANEL: CPT

## 2022-06-02 PROCEDURE — 36415 COLL VENOUS BLD VENIPUNCTURE: CPT

## 2022-06-02 PROCEDURE — 82306 VITAMIN D 25 HYDROXY: CPT

## 2022-06-07 ENCOUNTER — OFFICE VISIT (OUTPATIENT)
Dept: PRIMARY CARE CLINIC | Age: 67
End: 2022-06-07
Payer: MEDICARE

## 2022-06-07 VITALS
SYSTOLIC BLOOD PRESSURE: 132 MMHG | OXYGEN SATURATION: 99 % | DIASTOLIC BLOOD PRESSURE: 84 MMHG | HEART RATE: 67 BPM | TEMPERATURE: 96.8 F | HEIGHT: 63 IN | WEIGHT: 162 LBS | BODY MASS INDEX: 28.7 KG/M2

## 2022-06-07 DIAGNOSIS — I72.8 SPLENIC ARTERY ANEURYSM (HCC): ICD-10-CM

## 2022-06-07 DIAGNOSIS — Z12.11 COLON CANCER SCREENING: ICD-10-CM

## 2022-06-07 DIAGNOSIS — Z17.0 STAGE 1 BREAST CANCER, ER+, LEFT (HCC): Primary | ICD-10-CM

## 2022-06-07 DIAGNOSIS — C50.912 STAGE 1 BREAST CANCER, ER+, LEFT (HCC): Primary | ICD-10-CM

## 2022-06-07 DIAGNOSIS — I48.0 PAF (PAROXYSMAL ATRIAL FIBRILLATION) (HCC): ICD-10-CM

## 2022-06-07 DIAGNOSIS — E78.5 HYPERLIPIDEMIA, UNSPECIFIED HYPERLIPIDEMIA TYPE: ICD-10-CM

## 2022-06-07 PROCEDURE — G8399 PT W/DXA RESULTS DOCUMENT: HCPCS | Performed by: FAMILY MEDICINE

## 2022-06-07 PROCEDURE — 3017F COLORECTAL CA SCREEN DOC REV: CPT | Performed by: FAMILY MEDICINE

## 2022-06-07 PROCEDURE — 1036F TOBACCO NON-USER: CPT | Performed by: FAMILY MEDICINE

## 2022-06-07 PROCEDURE — G8427 DOCREV CUR MEDS BY ELIG CLIN: HCPCS | Performed by: FAMILY MEDICINE

## 2022-06-07 PROCEDURE — G8417 CALC BMI ABV UP PARAM F/U: HCPCS | Performed by: FAMILY MEDICINE

## 2022-06-07 PROCEDURE — 1123F ACP DISCUSS/DSCN MKR DOCD: CPT | Performed by: FAMILY MEDICINE

## 2022-06-07 PROCEDURE — 1090F PRES/ABSN URINE INCON ASSESS: CPT | Performed by: FAMILY MEDICINE

## 2022-06-07 PROCEDURE — 99214 OFFICE O/P EST MOD 30 MIN: CPT | Performed by: FAMILY MEDICINE

## 2022-06-07 ASSESSMENT — ENCOUNTER SYMPTOMS
EYE ITCHING: 0
SINUS PRESSURE: 0
NAUSEA: 0
ABDOMINAL DISTENTION: 0
SORE THROAT: 0
WHEEZING: 0
COUGH: 0
RHINORRHEA: 0
FACIAL SWELLING: 0
COLOR CHANGE: 0
CONSTIPATION: 0
EYE PAIN: 0
ABDOMINAL PAIN: 0
EYE DISCHARGE: 0
SHORTNESS OF BREATH: 0
VOMITING: 0
DIARRHEA: 0
PHOTOPHOBIA: 0
BLOOD IN STOOL: 0

## 2022-06-07 ASSESSMENT — PATIENT HEALTH QUESTIONNAIRE - PHQ9
SUM OF ALL RESPONSES TO PHQ QUESTIONS 1-9: 0
2. FEELING DOWN, DEPRESSED OR HOPELESS: 0
SUM OF ALL RESPONSES TO PHQ QUESTIONS 1-9: 0
1. LITTLE INTEREST OR PLEASURE IN DOING THINGS: 0
SUM OF ALL RESPONSES TO PHQ9 QUESTIONS 1 & 2: 0
SUM OF ALL RESPONSES TO PHQ QUESTIONS 1-9: 0
SUM OF ALL RESPONSES TO PHQ QUESTIONS 1-9: 0

## 2022-06-07 NOTE — PROGRESS NOTES
Batsheva Grayson (:  1955) is a 77 y.o. female,Established patient, here for evaluation of the following chief complaint(s):  6 Month Follow-Up (Pt complains of feeling like she has hot flashes and heart pounds. She has appt in Aug to see Dr Susan Adamson)         ASSESSMENT/PLAN:  1. Stage 1 breast cancer, ER+, left ([address]  2. Splenic artery aneurysm ([facility] [address]  3. PAF (paroxysmal atrial fibrillation) (HCC)  -     CBC; Future  4. Colon cancer screening  -     Cologuard  5. Hyperlipidemia, unspecified hyperlipidemia type  -     CBC; Future  -     Comprehensive Metabolic Panel, Fasting; Future  -     Lipid Panel; Future  -     TSH; Future      PLAN:   Patient follows with Dr. Sarah Hernandes q 2 years to monitor splenic artery aneurysm. Octavia Sagastume Patient follows with Dr. Caroline Miranda annually in regards to her breast cancer.  Resume Zetia 10 mg at least 3 to 4 days weekly.  Advise moderate intensity cardiovascular exercise minimum 150 minutes weekly.  Advised patient that she needs an event recorder to rule out cardiac dysrhythmia. We will discuss this with 's upcoming appointment.  She declines colonoscopy but was agreeable to a Cologuard.  Labs reviewed with patient in detail. Return in about 6 months (around 2022) for AWV. Subjective   SUBJECTIVE/OBJECTIVE:  Patient here for checkup. Review of labs. She complains of episodes of heart pounding and racing with lightheadedness. She typically needs to sit down and then symptoms resolve after a couple of minutes. She denies any chest discomfort or shortness of breath. No swelling in the ankles. She has had this before. Was actually evaluated at [facility] in November and had a 2D echocardiogram performed. She does have an upcoming appointment as well with Dr. Alberto Crowe. Review of Systems   Constitutional: Positive for fatigue. Negative for chills and fever.    HENT: Negative for congestion, ear discharge, ear pain, facial swelling, hearing loss, nosebleeds, rhinorrhea, sinus pressure and sore throat. Eyes: Negative for photophobia, pain, discharge, itching and visual disturbance. Respiratory: Negative for cough, shortness of breath and wheezing. Cardiovascular: Negative for chest pain, palpitations and leg swelling. Gastrointestinal: Negative for abdominal distention, abdominal pain, blood in stool, constipation, diarrhea, nausea and vomiting. Endocrine: Negative for polydipsia, polyphagia and polyuria. Genitourinary: Negative for difficulty urinating, dysuria, frequency, hematuria and urgency. Musculoskeletal: Negative for arthralgias, joint swelling and myalgias. Skin: Negative for color change and rash. Allergic/Immunologic: Negative for environmental allergies and food allergies. Neurological: Positive for dizziness and light-headedness. Negative for seizures, syncope, weakness, numbness and headaches. Psychiatric/Behavioral: Negative for confusion, hallucinations and suicidal ideas. The patient is not nervous/anxious.           Immunization History   Administered Date(s) Administered    COVID-19, Pfizer Purple top, DILUTE for use, 12+ yrs, 30mcg/0.3mL dose 01/31/2021, 02/21/2021, 09/27/2021    Hepatitis B Adult (Recombivax HB) 08/04/1989    Influenza Virus Vaccine 10/05/2017, 10/08/2018, 10/14/2019    Influenza, Quadv, adjuvanted, 65 yrs +, IM, PF (Fluad) 10/16/2020, 10/22/2021    Pneumococcal Conjugate 13-valent ([id number]) 11/11/2020    Pneumococcal Polysaccharide ([id number]) 11/12/2021    Tdap (Boostrix, Adacel) 05/16/2019         Objective   /84   Pulse 67   Temp 96.8 °F (36 °C) (Temporal)   Ht 5' 3\" (1.6 m)   Wt 162 lb (73.5 kg)   SpO2 99%   Breastfeeding No   BMI 28.70 kg/m²   Current Outpatient Medications   Medication Sig Dispense Refill    lisinopril (PRINIVIL;ZESTRIL) 2.5 MG tablet TAKE ONE TABLET BY MOUTH EVERY DAY 30 tablet 5    furosemide (LASIX) 20 MG tablet Take 1 tablet by mouth daily 90 tablet 1    metoprolol succinate (TOPROL XL) 25 MG extended release tablet TAKE ONE TABLET BY MOUTH EVERY DAY 90 tablet 3    aspirin-acetaminophen-caffeine (EXCEDRIN MIGRAINE) 276-721-74 MG per tablet Take 1 tablet by mouth every 6 hours as needed for Headaches Patient takes as needed for joint pain and headaches.  Selenium 200 MCG CAPS Take 200 mg by mouth 2 times daily      calcium carbonate (OSCAL) 500 MG TABS tablet Take 500 mg by mouth daily. No current facility-administered medications for this visit. Physical Exam  Vitals and nursing note reviewed. Constitutional:       General: She is not in acute distress. Appearance: Normal appearance. HENT:      Head: Normocephalic and atraumatic. Right Ear: Tympanic membrane, ear canal and external ear normal. There is no impacted cerumen. Left Ear: Tympanic membrane, ear canal and external ear normal. There is no impacted cerumen. Nose: Nose normal.      Mouth/Throat:      Mouth: Mucous membranes are moist.      Pharynx: No oropharyngeal exudate or posterior oropharyngeal erythema. Eyes:      Extraocular Movements: Extraocular movements intact. Conjunctiva/sclera: Conjunctivae normal.      Pupils: Pupils are equal, round, and reactive to light. Neck:      Vascular: No carotid bruit. Comments: Trachea midline. No JVD. No thyromegaly or nodules. Cardiovascular:      Rate and Rhythm: Normal rate and regular rhythm. Pulses: Normal pulses. Heart sounds: Murmur (Grade 2/6 systolic) heard. Pulmonary:      Effort: Pulmonary effort is normal. No respiratory distress. Breath sounds: Normal breath sounds. No wheezing, rhonchi or rales. Musculoskeletal:         General: No swelling, tenderness or deformity. Cervical back: Normal range of motion. Right lower leg: No edema. Left lower leg: No edema. Lymphadenopathy:      Cervical: No cervical adenopathy.    Skin:     General: Skin is warm and dry. Neurological:      General: No focal deficit present. Mental Status: She is alert and oriented to person, place, and time. Cranial Nerves: No cranial nerve deficit. Psychiatric:         Mood and Affect: Mood normal.         Behavior: Behavior normal.         Thought Content:  Thought content normal.         Judgment: Judgment normal.            CBC  WBC   Date Value Ref Range Status   06/02/2022 5.5 4.5 - 11.5 E9/L Final     RBC   Date Value Ref Range Status   06/02/2022 4.48 3.50 - 5.50 E12/L Final     Hemoglobin   Date Value Ref Range Status   06/02/2022 13.8 11.5 - 15.5 g/dL Final     Hematocrit   Date Value Ref Range Status   06/02/2022 41.9 34.0 - 48.0 % Final     MCV   Date Value Ref Range Status   06/02/2022 93.5 80.0 - 99.9 fL Final     MCH   Date Value Ref Range Status   06/02/2022 30.8 26.0 - 35.0 pg Final     MCHC   Date Value Ref Range Status   06/02/2022 32.9 32.0 - 34.5 % Final     RDW   Date Value Ref Range Status   06/02/2022 12.4 11.5 - 15.0 fL Final     Platelets   Date Value Ref Range Status   06/02/2022 196 130 - 450 E9/L Final     MPV   Date Value Ref Range Status   06/02/2022 10.3 7.0 - 12.0 fL Final     Neutrophils %   Date Value Ref Range Status   08/02/2021 61.0 43.0 - 80.0 % Final     Immature Granulocytes #   Date Value Ref Range Status   08/02/2021 0.02 E9/L Final     Immature Granulocytes %   Date Value Ref Range Status   08/02/2021 0.4 0.0 - 5.0 % Final     Lymphocytes %   Date Value Ref Range Status   08/02/2021 27.2 20.0 - 42.0 % Final     Monocytes %   Date Value Ref Range Status   08/02/2021 7.7 2.0 - 12.0 % Final     Eosinophils %   Date Value Ref Range Status   08/02/2021 2.9 0.0 - 6.0 % Final     Basophils %   Date Value Ref Range Status   08/02/2021 0.8 0.0 - 2.0 % Final     Neutrophils Absolute   Date Value Ref Range Status   08/02/2021 3.16 1.80 - 7.30 E9/L Final     Lymphocytes Absolute   Date Value Ref Range Status   08/02/2021 1.41 (L) 1.50 - 4.00 E9/L Final     Monocytes Absolute   Date Value Ref Range Status   08/02/2021 0.40 0.10 - 0.95 E9/L Final     Eosinophils Absolute   Date Value Ref Range Status   08/02/2021 0.15 0.05 - 0.50 E9/L Final     Basophils Absolute   Date Value Ref Range Status   08/02/2021 0.04 0.00 - 0.20 E9/L Final       CMP  Sodium   Date Value Ref Range Status   06/02/2022 140 132 - 146 mmol/L Final     Potassium   Date Value Ref Range Status   06/02/2022 4.4 3.5 - 5.0 mmol/L Final     Chloride   Date Value Ref Range Status   06/02/2022 106 98 - 107 mmol/L Final     CO2   Date Value Ref Range Status   06/02/2022 25 22 - 29 mmol/L Final     Anion Gap   Date Value Ref Range Status   06/02/2022 9 7 - 16 mmol/L Final     Glucose   Date Value Ref Range Status   08/02/2021 84 74 - 99 mg/dL Final   04/09/2012 109 70 - 110 mg/dL Final     BUN   Date Value Ref Range Status   06/02/2022 21 6 - 23 mg/dL Final     CREATININE   Date Value Ref Range Status   06/02/2022 0.9 0.5 - 1.0 mg/dL Final     GFR Non-   Date Value Ref Range Status   06/02/2022 >60 >=60 mL/min/1.73 Final     Comment:     Chronic Kidney Disease: less than 60 ml/min/1.73 sq.m. Kidney Failure: less than 15 ml/min/1.73 sq.m. Results valid for patients 18 years and older.        GFR    Date Value Ref Range Status   06/02/2022 >60  Final     Calcium   Date Value Ref Range Status   06/02/2022 9.2 8.6 - 10.2 mg/dL Final     Total Protein   Date Value Ref Range Status   06/02/2022 6.7 6.4 - 8.3 g/dL Final     Albumin   Date Value Ref Range Status   06/02/2022 4.3 3.5 - 5.2 g/dL Final   04/09/2012 4.4 3.2 - 4.8 g/dL Final     Total Bilirubin   Date Value Ref Range Status   06/02/2022 0.6 0.0 - 1.2 mg/dL Final     Alkaline Phosphatase   Date Value Ref Range Status   06/02/2022 58 35 - 104 U/L Final     ALT   Date Value Ref Range Status   06/02/2022 13 0 - 32 U/L Final     AST   Date Value Ref Range Status   06/02/2022 20 0 - 31 U/L Final TSH  Lab Results   Component Value Date    TSH 1.090 06/02/2022       A1C  Lab Results   Component Value Date    LABA1C 5.1 11/23/2019       LIPID  Lab Results   Component Value Date    CHOL 260 (H) 06/02/2022    TRIG 89 06/02/2022    HDL 80 06/02/2022    LDLCALC 162 (H) 06/02/2022    LABVLDL 18 06/02/2022    VLDL 14 12/04/2020        No results found for this visit on 06/07/22. An electronic signature was used to authenticate this note.     --Zahra Leija, DO

## 2022-06-14 ENCOUNTER — TELEPHONE (OUTPATIENT)
Dept: PRIMARY CARE CLINIC | Age: 67
End: 2022-06-14

## 2022-06-14 NOTE — TELEPHONE ENCOUNTER
Pc from pt states she has congestion cough fever nausea and headache believes it is sinus. Pt did test positive for Covid. Recommend stay hydrated rest and vitamin regimen of Vit D 4000IU  Vit C 1000IU and Zinc 50mg.  Informed pt to call if symptoms become worse

## 2022-06-17 LAB — NONINV COLON CA DNA+OCC BLD SCRN STL QL: NEGATIVE

## 2022-07-14 RX ORDER — LISINOPRIL 2.5 MG/1
TABLET ORAL
Qty: 30 TABLET | Refills: 5 | Status: SHIPPED | OUTPATIENT
Start: 2022-07-14

## 2022-07-29 ENCOUNTER — HOSPITAL ENCOUNTER (OUTPATIENT)
Dept: INFUSION THERAPY | Age: 67
Discharge: HOME | End: 2022-07-29
Payer: MEDICARE

## 2022-07-29 DIAGNOSIS — C50.912 STAGE 1 BREAST CANCER, ER+, LEFT (HCC): ICD-10-CM

## 2022-07-29 DIAGNOSIS — Z17.0 STAGE 1 BREAST CANCER, ER+, LEFT (HCC): ICD-10-CM

## 2022-07-29 LAB
ALBUMIN SERPL-MCNC: 4.3 G/DL (ref 3.5–5.2)
ALP BLD-CCNC: 56 U/L (ref 35–104)
ALT SERPL-CCNC: 16 U/L (ref 0–32)
ANION GAP SERPL CALCULATED.3IONS-SCNC: 9 MMOL/L (ref 7–16)
AST SERPL-CCNC: 23 U/L (ref 0–31)
BASOPHILS ABSOLUTE: 0.05 E9/L (ref 0–0.2)
BASOPHILS RELATIVE PERCENT: 0.9 % (ref 0–2)
BILIRUB SERPL-MCNC: 0.6 MG/DL (ref 0–1.2)
BUN BLDV-MCNC: 21 MG/DL (ref 6–23)
CALCIUM SERPL-MCNC: 9.2 MG/DL (ref 8.6–10.2)
CHLORIDE BLD-SCNC: 104 MMOL/L (ref 98–107)
CO2: 28 MMOL/L (ref 22–29)
CREAT SERPL-MCNC: 1 MG/DL (ref 0.5–1)
EOSINOPHILS ABSOLUTE: 0.1 E9/L (ref 0.05–0.5)
EOSINOPHILS RELATIVE PERCENT: 1.7 % (ref 0–6)
GFR AFRICAN AMERICAN: >60
GFR NON-AFRICAN AMERICAN: 55 ML/MIN/1.73
GLUCOSE BLD-MCNC: 105 MG/DL (ref 74–99)
HCT VFR BLD CALC: 40.5 % (ref 34–48)
HEMOGLOBIN: 13.6 G/DL (ref 11.5–15.5)
IMMATURE GRANULOCYTES #: 0.04 E9/L
IMMATURE GRANULOCYTES %: 0.7 % (ref 0–5)
LYMPHOCYTES ABSOLUTE: 1.48 E9/L (ref 1.5–4)
LYMPHOCYTES RELATIVE PERCENT: 25.2 % (ref 20–42)
MCH RBC QN AUTO: 31.1 PG (ref 26–35)
MCHC RBC AUTO-ENTMCNC: 33.6 % (ref 32–34.5)
MCV RBC AUTO: 92.5 FL (ref 80–99.9)
MONOCYTES ABSOLUTE: 0.54 E9/L (ref 0.1–0.95)
MONOCYTES RELATIVE PERCENT: 9.2 % (ref 2–12)
NEUTROPHILS ABSOLUTE: 3.67 E9/L (ref 1.8–7.3)
NEUTROPHILS RELATIVE PERCENT: 62.3 % (ref 43–80)
PDW BLD-RTO: 12.3 FL (ref 11.5–15)
PLATELET # BLD: 206 E9/L (ref 130–450)
PMV BLD AUTO: 10.5 FL (ref 7–12)
POTASSIUM SERPL-SCNC: 4.3 MMOL/L (ref 3.5–5)
RBC # BLD: 4.38 E12/L (ref 3.5–5.5)
SODIUM BLD-SCNC: 141 MMOL/L (ref 132–146)
TOTAL PROTEIN: 6.8 G/DL (ref 6.4–8.3)
WBC # BLD: 5.9 E9/L (ref 4.5–11.5)

## 2022-07-29 PROCEDURE — 85025 COMPLETE CBC W/AUTO DIFF WBC: CPT

## 2022-07-29 PROCEDURE — 80053 COMPREHEN METABOLIC PANEL: CPT

## 2022-07-29 PROCEDURE — 36415 COLL VENOUS BLD VENIPUNCTURE: CPT

## 2022-07-29 NOTE — PROGRESS NOTES
[address] [facility]. [facility]        Pt Name: Vicente London  Birthdate: [birth year]  Date of evaluation: 7/29/2022  Primary Care Physician: Kymberly Stafford DO  Reason for evaluation:   Chief Complaint   Patient presents with    Breast Cancer     Left  ER+    Other     Vitamin D Deficiency    1 Year Follow Up        Subjective:  Here for yearly follow up and results of mammogram and Dexa scan . Feels well today. No c/o's. Reports occasional dyspnea on exertion in relation to her moderate aortic regurgitation        OBJECTIVE:  VITALS:  height is 5' 3\" (1.6 m) and weight is 173 lb 14.4 oz (78.9 kg). Her temperature is 96.9 °F (36.1 °C). Her blood pressure is 145/85 (abnormal) and her pulse is 54. Her oxygen saturation is 98%. Physical Exam:  Performance Status: 0  Well developed, well nourished female  EYES: sclera non-icteric. ENT: oropharynx clear. BREASTS:  Mild firmness along scar in Left breast,no lumps or axillary nodes. NECK: No lymphadenopathy. HEART: Regular rate and rhthym. LUNGS: Clear. ABDOMEN: Soft, nontender. No ascites. No mass or organomegaly. EXTREMITIES: without clubbing, cyanosis, or edema. NEUROLOGIC: No focal deficits. SKIN: No Rash. Medications  Prior to Admission medications    Medication Sig Start Date End Date Taking? Authorizing Provider   lisinopril (PRINIVIL;ZESTRIL) 2.5 MG tablet TAKE ONE TABLET BY MOUTH EVERY DAY 7/14/22   Kymberly Stafford DO   furosemide (LASIX) 20 MG tablet Take 1 tablet by mouth daily 12/7/21   Kymberly Stafford DO   metoprolol succinate (TOPROL XL) 25 MG extended release tablet TAKE ONE TABLET BY MOUTH EVERY DAY 11/3/21   Kymberly Stafford DO   aspirin-acetaminophen-caffeine (EXCEDRIN MIGRAINE) 828-305-75 MG per tablet Take 1 tablet by mouth every 6 hours as needed for Headaches Patient takes as needed for joint pain and headaches.     Historical Provider, MD   Selenium 200 MCG CAPS Take 200 mg by mouth 2 times daily    Historical Provider, MD   calcium carbonate (OSCAL) 500 MG TABS tablet Take 500 mg by mouth daily. Historical Provider, MD    Scheduled Meds:  Continuous Infusions:  PRN Meds:.        Recent Laboratory Data-     Lab Results   Component Value Date    WBC 5.9 07/29/2022    HGB 13.6 07/29/2022    HCT 40.5 07/29/2022    MCV 92.5 07/29/2022     07/29/2022    LYMPHOPCT 25.2 07/29/2022    RBC 4.38 07/29/2022    MCH 31.1 07/29/2022    MCHC 33.6 07/29/2022    RDW 12.3 07/29/2022    NEUTOPHILPCT 62.3 07/29/2022    MONOPCT 9.2 07/29/2022    BASOPCT 0.9 07/29/2022    NEUTROABS 3.67 07/29/2022    LYMPHSABS 1.48 (L) 07/29/2022    MONOSABS 0.54 07/29/2022    EOSABS 0.10 07/29/2022    BASOSABS 0.05 07/29/2022       Lab Results   Component Value Date     06/02/2022    K 4.4 06/02/2022     06/02/2022    CO2 25 06/02/2022    BUN 21 06/02/2022    CREATININE 0.9 06/02/2022    GLUCOSE 84 08/02/2021    CALCIUM 9.2 06/02/2022    PROT 6.7 06/02/2022    LABALBU 4.3 06/02/2022    BILITOT 0.6 06/02/2022    ALKPHOS 58 06/02/2022    AST 20 06/02/2022    ALT 13 06/02/2022    LABGLOM >60 06/02/2022    GFRAA >60 06/02/2022         Lab Results   Component Value Date     4.4 01/17/2011          Ref. Range 4/25/2016 07:25 5/5/2017 07:04 5/25/2017 11:17 5/4/2018 09:24 11/23/2019 09:01   CA 15-3 Latest Ref Range: 0 - 31 U/mL 10 12  10            Radiology-  BILATERAL SCREENING MAMMOGRAM:  12/17/2021  No mammographic evidence of malignancy in either breast.       RECOMMENDATION:       Annual bilateral mammogram is advised with consideration for annual bilateral   screening ultrasound given the patient's breast density. BIRADS:   BIRADS - CATEGORY 1       Negative.        OVERALL ASSESSMENT - NEGATIVE             DEXA BONE DENSITY:  12/17/2021  FINDINGS:   Lumbar spine bone mineral density BMD:       L1-L4 BMD (g/cm2): 0.990, T-score: -1.6, WHO designation: Osteopenia       Percent change from comparison: Positive 0.1 %       Slight lumbar curvature with upper right and lower left convexities       Femoral neck:       Left BMD (g/cm2): 0.929, T-score: -0.8, WHO designation: Normal       Right BMD (g/cm2): 0.851, T-score: -1.3, WHO designation: Osteopenia       Hip total:       Left BMD (g/cm2): 0.832, T-score: -1.4, WHO designation: Osteopenia       Right BMD (g/cm2): 0.772, T-score: -1.9, WHO designation: Osteopenia       Bilateral femur total mean percent change from comparison: -0.4 %       When compared to peak bone mass in young normal sex-matched adults, this   patient has low bone mass reduced to the level of osteopenia. Fracture risk   approximately doubles exponentially for every 1.0 decrease in T-score. This   patient has a higher risk of fractures when compared to young normal persons   of the same sex. Impression   Bone density is reduced to the level of osteopenia. Compared to 2018, bone density demonstrates interval increase in the lumbar   spine but decrease in the hips. ASSESSMENT/PLAN :  Postmenopausal left breast cancer, stage 2, D2sS0X9 diagnosed in NOV 2009. Her primary tumor measured 1.2 cm with positive ER and TN receptors, negative HER-2/fazal, nonamplified by FISH. She had micrometastasis involving 1 sentinel node. Her sentinel node had 5 separate microscopic foci of metastasis, all greater than 0.2 mm. She received 4 cycles of adjuvant chemotherapy with Taxotere and Cytoxan completed in March 2010. She started hormonal therapy with Arimidex in April 2010 and continues to do well without evidence of disease recurrence     Completed Arimidex April 5, 2015.     Follow up mammograms done in December 2015 was negative and He Bone density in April 2016 showed moderate osteopenia and she will continue on calcium and vitamin D supplements     She will continue to follow with endocrinology and neurology at [facility] regarding her Graves' disease of the left eye     Most recent mammogram in December 2018 was negative and most recent DEXA scan also done in December 2018 showed osteopenia of the lumbar spine and the femoral necks  Maintain Oscal for ostopenia . Most recent mammogram in December 2019 was negative and most recent DEXA scan also done in 2020 showed osteopenia of the lumbar spine and the femoral necks  Maintain Oscal for ostopenia . Sriram Kaba She will have follow-up mammogram in December 2021 and a follow-up bone density in May 2020      8/01/2022  Had Covid in June and recovered well . Mammogram done December 17, 2021 was Negative. Bone Density Scan done 12/17/2021 had shown improvement in bone density and she has mild residual osteopenia and continues on calcium and vitamin D supplements. Her exam is negative. She is thinking about breast reduction on the right side and will be referred to Dr. Charles Meyer. Se Plaza M.D., F.A.C.P.   Electronically signed 7/29/2022 at 7:07 AM

## 2022-08-01 ENCOUNTER — OFFICE VISIT (OUTPATIENT)
Dept: ONCOLOGY | Age: 67
End: 2022-08-01
Payer: MEDICARE

## 2022-08-01 VITALS
TEMPERATURE: 96.9 F | BODY MASS INDEX: 30.81 KG/M2 | DIASTOLIC BLOOD PRESSURE: 85 MMHG | HEIGHT: 63 IN | HEART RATE: 54 BPM | WEIGHT: 173.9 LBS | SYSTOLIC BLOOD PRESSURE: 145 MMHG | OXYGEN SATURATION: 98 %

## 2022-08-01 DIAGNOSIS — E55.9 VITAMIN D DEFICIENCY: ICD-10-CM

## 2022-08-01 DIAGNOSIS — C50.912 STAGE 1 BREAST CANCER, ER+, LEFT (HCC): Primary | ICD-10-CM

## 2022-08-01 DIAGNOSIS — Z17.0 STAGE 1 BREAST CANCER, ER+, LEFT (HCC): Primary | ICD-10-CM

## 2022-08-01 PROCEDURE — 99213 OFFICE O/P EST LOW 20 MIN: CPT

## 2022-08-01 RX ORDER — EZETIMIBE 10 MG/1
TABLET ORAL
COMMUNITY
Start: 2022-07-18

## 2022-09-15 DIAGNOSIS — I72.8 SPLENIC ARTERY ANEURYSM (HCC): Primary | ICD-10-CM

## 2022-09-19 ENCOUNTER — HOSPITAL ENCOUNTER (OUTPATIENT)
Dept: CT IMAGING | Age: 67
Discharge: HOME | End: 2022-09-19
Payer: MEDICARE

## 2022-09-19 DIAGNOSIS — I72.8 SPLENIC ARTERY ANEURYSM (HCC): ICD-10-CM

## 2022-09-19 PROCEDURE — 74150 CT ABDOMEN W/O CONTRAST: CPT

## 2022-10-04 ENCOUNTER — OFFICE VISIT (OUTPATIENT)
Dept: VASCULAR SURGERY | Age: 67
End: 2022-10-04
Payer: MEDICARE

## 2022-10-04 DIAGNOSIS — I72.8 SPLENIC ARTERY ANEURYSM (HCC): Primary | ICD-10-CM

## 2022-10-04 PROCEDURE — G8417 CALC BMI ABV UP PARAM F/U: HCPCS | Performed by: SURGERY

## 2022-10-04 PROCEDURE — 99213 OFFICE O/P EST LOW 20 MIN: CPT | Performed by: SURGERY

## 2022-10-04 PROCEDURE — 1123F ACP DISCUSS/DSCN MKR DOCD: CPT | Performed by: SURGERY

## 2022-10-04 PROCEDURE — 1036F TOBACCO NON-USER: CPT | Performed by: SURGERY

## 2022-10-04 PROCEDURE — G8399 PT W/DXA RESULTS DOCUMENT: HCPCS | Performed by: SURGERY

## 2022-10-04 PROCEDURE — G8484 FLU IMMUNIZE NO ADMIN: HCPCS | Performed by: SURGERY

## 2022-10-04 PROCEDURE — G8427 DOCREV CUR MEDS BY ELIG CLIN: HCPCS | Performed by: SURGERY

## 2022-10-04 PROCEDURE — 1090F PRES/ABSN URINE INCON ASSESS: CPT | Performed by: SURGERY

## 2022-10-04 PROCEDURE — 3017F COLORECTAL CA SCREEN DOC REV: CPT | Performed by: SURGERY

## 2022-10-04 NOTE — PROGRESS NOTES
Vascular Surgery Outpatient Progress Note      Chief Complaint   Patient presents with    Circulatory Problem     Follow up splenic artery aneurysm. HISTORY OF PRESENT ILLNESS:                The patient is a 79 y.o. female who returns for follow-up evaluation of her splenic artery aneurysm. The patient states testing for recurrent atrial fibrillation but states that she has been in sinus rhythm. She denies any abdominal pain specifically left upper quadrant abdominal or flank pain. 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  Authorizing Provider   lisinopril (PRINIVIL;ZESTRIL) 2.5 MG tablet TAKE ONE TABLET BY MOUTH EVERY DAY 7/14/22  Yes Darren Francisco, DO   furosemide (LASIX) 20 MG tablet Take 1 tablet by mouth daily 12/7/21  Yes Darren Francisco, DO   metoprolol succinate (TOPROL XL) 25 MG extended release tablet TAKE ONE TABLET BY MOUTH EVERY DAY 11/3/21  Yes Darren Francisco, DO   aspirin-acetaminophen-caffeine (EXCEDRIN MIGRAINE) 903093-52 MG per tablet Take 1 tablet by mouth every 6 hours as needed for Headaches Patient takes as needed for joint pain and headaches. Yes Historical Provider, MD   Selenium 200 MCG CAPS Take 200 mg by mouth 2 times daily   Yes Historical Provider, MD   calcium carbonate (OSCAL) 500 MG TABS tablet Take 500 mg by mouth daily.      Yes Historical Provider, MD   ezetimibe (ZETIA) 10 MG tablet TAKE ONE TABLET BY MOUTH EVERY DAY  Patient not taking: Reported on 10/4/2022 7/18/22   Historical Provider, MD     Allergies:  Latex, Flecainide, Fluconazole in dextrose, Krill oil, Other, Simvastatin, and Tambocor [flecainide acetate]    Social History     Socioeconomic History    Marital status:      Spouse name: Not on file    Number of children: Not on file    Years of education: Not on file    Highest education level: Not on file   Occupational History    Not on file   Tobacco Use    Smoking status: Never    Smokeless tobacco: Never   Vaping Use    Vaping Use: Never used   Substance and Sexual Activity    Alcohol use: No     Comment: occassionally-rare    Drug use: No    Sexual activity: Yes     Partners: Male   Other Topics Concern    Not on file   Social History Narrative    Not on file     Social Determinants of Health     Financial Resource Strain: Low Risk     Difficulty of Paying Living Expenses: Not hard at all   Food Insecurity: No Food Insecurity    Worried About Running Out of Food in the Last Year: Never true    Ran Out of Food in the Last Year: Never true   Transportation Needs: Not on file   Physical Activity: Not on file   Stress: Not on file   Social Connections: Not on file   Intimate Partner Violence: Not on file   Housing Stability: Not on file        Family History   Problem Relation Age of Onset    Heart Disease Mother     Breast Cancer Mother     Cancer Sister     High Blood Pressure Sister     Breast Cancer Sister     Parkinson's Disease Sister     High Blood Pressure Brother     High Blood Pressure Brother        REVIEW OF SYSTEMS (New symptoms):    Eyes:      Blurred vision:  No [x]/Yes []               Diplopia:   No [x]/Yes []               Vision loss:       No [x]/Yes []   Ears, nose, throat:             Hearing loss:    No [x]/Yes []      Vertigo:   No [x]/Yes []                       Swallowing problem:  No [x]/Yes []               Nose bleeds:   No [x]/Yes []      Voice hoarseness:  No [x]/Yes []  Respiratory:             Cough:   No [x]/Yes []      Pleuritic chest pain:  No [x]/Yes []                        Dyspnea:   No [x]/Yes []      Wheezing:   No [x]/Yes []  Cardiovascular:             Angina:   No [x]/Yes []      Palpitations:   No [x]/Yes []          Claudication:    No [x]/Yes []      Leg swelling:   No [x]/Yes []  Gastrointestinal:             Nausea or vomiting:  No [x]/Yes []               Abdominal pain:  No [x]/Yes []                     Intestinal bleeding: No [x]/Yes []  Musculoskeletal:             Leg pain:   No [x]/Yes []      Back pain:   No [x]/Yes []                    Weakness:   No [x]/Yes []  Neurologic:             Numbness:   No [x]/Yes []      Paralysis:   No [x]/Yes []                       Headaches:   No [x]/Yes []  Hematologic, lymphatic:   Anemia:   No [x]/Yes []              Bleeding or bruising:  No [x]/Yes []              Fevers or chills: No [x]/Yes []  Endocrine:             Temp intolerance:   No [x]/Yes []                       Polydipsia, polyuria:  No [x]/Yes []  Skin:              Rash:    No [x]/Yes []      Ulcers:   No [x]/Yes []              Abnorm pigment: No [x]/Yes []  :              Frequency/urgency:  No [x]/Yes []      Hematuria:    No [x]/Yes []                      Incontinence:    No [x]/Yes []    PHYSICAL EXAM:  There were no vitals filed for this visit.   General Appearance: alert and oriented to person, place and time, in no acute distress, well developed and well- nourished  Neurologic: no cranial nerve deficit, speech normal  Head: normocephalic and atraumatic  Eyes: extraocular eye movements intact, conjunctivae normal  ENT: external ear and ear canal normal bilaterally, nose without deformity, no carotid bruits  Pulmonary/Chest: normal air movement, no respiratory distress  Cardiovascular: normal rate, regular rhythm, no murmur  Abdomen: non-distended, no masses  Musculoskeletal: no joint deformity or tenderness  Extremities: no leg edema bilaterally  Skin: warm and dry, no rash or erythema    PULSE EXAM      Right      Left   Brachial     Radial 2 2   Femoral     Popliteal     Dorsalis Pedis     Posterior Tibial 2 2   (3=normal, 2=diminished, 1=barely palpable, 4=widened)    RADIOLOGY: [facility] today    Problem List Items Addressed This Visit       Splenic artery aneurysm (HCC) - Primary    Relevant Orders    CT ABDOMEN WO CONTRAST Additional Contrast? None       I reviewed the CAT scan of the abdomen performed 9/22/2022 that shows no change in the size of the splenic artery aneurysm which is heavily calcified and measures 15 mm in maximum diameter. I reviewed with the patient that from my standpoint she can continue with all her normal activities. I will plan to see her again in 2 years with a repeat scan but asked her to call sooner with any changes or new problems. No follow-ups on file.

## 2022-10-19 DIAGNOSIS — Z12.31 SCREENING MAMMOGRAM FOR HIGH-RISK PATIENT: Primary | ICD-10-CM

## 2022-11-18 RX ORDER — METOPROLOL SUCCINATE 25 MG/1
TABLET, EXTENDED RELEASE ORAL
Qty: 90 TABLET | Refills: 3 | Status: SHIPPED | OUTPATIENT
Start: 2022-11-18

## 2022-12-07 ENCOUNTER — HOSPITAL ENCOUNTER (OUTPATIENT)
Age: 67
Discharge: HOME | End: 2022-12-07
Payer: MEDICARE

## 2022-12-07 DIAGNOSIS — E78.5 HYPERLIPIDEMIA, UNSPECIFIED HYPERLIPIDEMIA TYPE: ICD-10-CM

## 2022-12-07 DIAGNOSIS — I48.0 PAF (PAROXYSMAL ATRIAL FIBRILLATION) (HCC): ICD-10-CM

## 2022-12-07 LAB
ALBUMIN SERPL-MCNC: 3.9 G/DL (ref 3.5–5.2)
ALP BLD-CCNC: 74 U/L (ref 35–104)
ALT SERPL-CCNC: 44 U/L (ref 0–32)
ANION GAP SERPL CALCULATED.3IONS-SCNC: 8 MMOL/L (ref 7–16)
AST SERPL-CCNC: 34 U/L (ref 0–31)
BILIRUB SERPL-MCNC: 0.4 MG/DL (ref 0–1.2)
BUN BLDV-MCNC: 20 MG/DL (ref 6–23)
CALCIUM SERPL-MCNC: 9.4 MG/DL (ref 8.6–10.2)
CHLORIDE BLD-SCNC: 102 MMOL/L (ref 98–107)
CHOLESTEROL, TOTAL: 252 MG/DL (ref 0–199)
CO2: 29 MMOL/L (ref 22–29)
CREAT SERPL-MCNC: 1.1 MG/DL (ref 0.5–1)
GFR SERPL CREATININE-BSD FRML MDRD: 55 ML/MIN/1.73
GLUCOSE FASTING: 92 MG/DL (ref 74–99)
HCT VFR BLD CALC: 39.9 % (ref 34–48)
HDLC SERPL-MCNC: 66 MG/DL
HEMOGLOBIN: 13.4 G/DL (ref 11.5–15.5)
LDL CHOLESTEROL CALCULATED: 170 MG/DL (ref 0–99)
MCH RBC QN AUTO: 31.2 PG (ref 26–35)
MCHC RBC AUTO-ENTMCNC: 33.6 % (ref 32–34.5)
MCV RBC AUTO: 93 FL (ref 80–99.9)
PDW BLD-RTO: 12.4 FL (ref 11.5–15)
PLATELET # BLD: 198 E9/L (ref 130–450)
PMV BLD AUTO: 10.6 FL (ref 7–12)
POTASSIUM SERPL-SCNC: 4.3 MMOL/L (ref 3.5–5)
RBC # BLD: 4.29 E12/L (ref 3.5–5.5)
SODIUM BLD-SCNC: 139 MMOL/L (ref 132–146)
TOTAL PROTEIN: 6.5 G/DL (ref 6.4–8.3)
TRIGL SERPL-MCNC: 78 MG/DL (ref 0–149)
TSH SERPL DL<=0.05 MIU/L-ACNC: 1.55 UIU/ML (ref 0.27–4.2)
VLDLC SERPL CALC-MCNC: 16 MG/DL
WBC # BLD: 6.1 E9/L (ref 4.5–11.5)

## 2022-12-07 PROCEDURE — 80061 LIPID PANEL: CPT

## 2022-12-07 PROCEDURE — 36415 COLL VENOUS BLD VENIPUNCTURE: CPT

## 2022-12-07 PROCEDURE — 80053 COMPREHEN METABOLIC PANEL: CPT

## 2022-12-07 PROCEDURE — 84443 ASSAY THYROID STIM HORMONE: CPT

## 2022-12-07 PROCEDURE — 85027 COMPLETE CBC AUTOMATED: CPT

## 2022-12-07 SDOH — HEALTH STABILITY: PHYSICAL HEALTH: ON AVERAGE, HOW MANY DAYS PER WEEK DO YOU ENGAGE IN MODERATE TO STRENUOUS EXERCISE (LIKE A BRISK WALK)?: 1 DAY

## 2022-12-07 ASSESSMENT — LIFESTYLE VARIABLES
HOW OFTEN DO YOU HAVE SIX OR MORE DRINKS ON ONE OCCASION: 1
HOW MANY STANDARD DRINKS CONTAINING ALCOHOL DO YOU HAVE ON A TYPICAL DAY: 1 OR 2
HOW OFTEN DO YOU HAVE A DRINK CONTAINING ALCOHOL: 3
HOW OFTEN DO YOU HAVE A DRINK CONTAINING ALCOHOL: 2-4 TIMES A MONTH
HOW MANY STANDARD DRINKS CONTAINING ALCOHOL DO YOU HAVE ON A TYPICAL DAY: 1

## 2022-12-07 ASSESSMENT — PATIENT HEALTH QUESTIONNAIRE - PHQ9
SUM OF ALL RESPONSES TO PHQ QUESTIONS 1-9: 0
1. LITTLE INTEREST OR PLEASURE IN DOING THINGS: 0
SUM OF ALL RESPONSES TO PHQ QUESTIONS 1-9: 0
SUM OF ALL RESPONSES TO PHQ9 QUESTIONS 1 & 2: 0
SUM OF ALL RESPONSES TO PHQ QUESTIONS 1-9: 0
SUM OF ALL RESPONSES TO PHQ QUESTIONS 1-9: 0
2. FEELING DOWN, DEPRESSED OR HOPELESS: 0

## 2022-12-08 ENCOUNTER — OFFICE VISIT (OUTPATIENT)
Dept: PRIMARY CARE CLINIC | Age: 67
End: 2022-12-08
Payer: MEDICARE

## 2022-12-08 VITALS
OXYGEN SATURATION: 100 % | SYSTOLIC BLOOD PRESSURE: 120 MMHG | HEIGHT: 63 IN | WEIGHT: 174 LBS | DIASTOLIC BLOOD PRESSURE: 82 MMHG | TEMPERATURE: 97.4 F | BODY MASS INDEX: 30.83 KG/M2 | HEART RATE: 70 BPM

## 2022-12-08 DIAGNOSIS — Z00.00 INITIAL MEDICARE ANNUAL WELLNESS VISIT: Primary | ICD-10-CM

## 2022-12-08 PROCEDURE — 3078F DIAST BP <80 MM HG: CPT | Performed by: FAMILY MEDICINE

## 2022-12-08 PROCEDURE — 3017F COLORECTAL CA SCREEN DOC REV: CPT | Performed by: FAMILY MEDICINE

## 2022-12-08 PROCEDURE — G8484 FLU IMMUNIZE NO ADMIN: HCPCS | Performed by: FAMILY MEDICINE

## 2022-12-08 PROCEDURE — 1123F ACP DISCUSS/DSCN MKR DOCD: CPT | Performed by: FAMILY MEDICINE

## 2022-12-08 PROCEDURE — G0438 PPPS, INITIAL VISIT: HCPCS | Performed by: FAMILY MEDICINE

## 2022-12-08 PROCEDURE — 3074F SYST BP LT 130 MM HG: CPT | Performed by: FAMILY MEDICINE

## 2022-12-08 SDOH — ECONOMIC STABILITY: FOOD INSECURITY: WITHIN THE PAST 12 MONTHS, YOU WORRIED THAT YOUR FOOD WOULD RUN OUT BEFORE YOU GOT MONEY TO BUY MORE.: NEVER TRUE

## 2022-12-08 SDOH — ECONOMIC STABILITY: FOOD INSECURITY: WITHIN THE PAST 12 MONTHS, THE FOOD YOU BOUGHT JUST DIDN'T LAST AND YOU DIDN'T HAVE MONEY TO GET MORE.: NEVER TRUE

## 2022-12-08 ASSESSMENT — PATIENT HEALTH QUESTIONNAIRE - PHQ9
SUM OF ALL RESPONSES TO PHQ QUESTIONS 1-9: 0
SUM OF ALL RESPONSES TO PHQ QUESTIONS 1-9: 0
2. FEELING DOWN, DEPRESSED OR HOPELESS: 0
SUM OF ALL RESPONSES TO PHQ9 QUESTIONS 1 & 2: 0
1. LITTLE INTEREST OR PLEASURE IN DOING THINGS: 0
SUM OF ALL RESPONSES TO PHQ QUESTIONS 1-9: 0
SUM OF ALL RESPONSES TO PHQ QUESTIONS 1-9: 0

## 2022-12-08 ASSESSMENT — SOCIAL DETERMINANTS OF HEALTH (SDOH): HOW HARD IS IT FOR YOU TO PAY FOR THE VERY BASICS LIKE FOOD, HOUSING, MEDICAL CARE, AND HEATING?: NOT HARD AT ALL

## 2022-12-08 NOTE — PATIENT INSTRUCTIONS
Learning About Vision Tests  What are vision tests? The four most common vision tests are visual acuity tests, refraction, visual field tests, and color vision tests. Visual acuity (sharpness) tests  These tests are used: To see if you need glasses or contact lenses. To monitor an eye problem. To check an eye injury. Visual acuity tests are done as part of routine exams. You may also have this test when you get your 's license or apply for some types of jobs. Visual field tests  These tests are used: To check for vision loss in any area of your range of vision. To screen for certain eye diseases. To look for nerve damage after a stroke, head injury, or other problem that could reduce blood flow to the brain. Refraction and color tests  A refraction test is done to find the right prescription for glasses and contact lenses. A color vision test is done to check for color blindness. Color vision is often tested as part of a routine exam. You may also have this test when you apply for a job where recognizing different colors is important, such as , electronics, or the [employer]. How are vision tests done? Visual acuity test   You cover one eye at a time. You read aloud from a wall chart across the room. You read aloud from a small card that you hold in your hand. Refraction   You look into a special device. The device puts lenses of different strengths in front of each eye to see how strong your glasses or contact lenses need to be. Visual field tests   Your doctor may have you look through special machines. Or your doctor may simply have you stare straight ahead while they move a finger into and out of your field of vision. Color vision test   You look at pieces of printed test patterns in various colors. You say what number or symbol you see. Your doctor may have you trace the number or symbol using a pointer. How do these tests feel?   There is very little chance of having a problem from this test. If dilating drops are used for a vision test, they may make the eyes sting and cause a medicine taste in the mouth. Follow-up care is a key part of your treatment and safety. Be sure to make and go to all appointments, and call your doctor if you are having problems. It's also a good idea to know your test results and keep a list of the medicines you take. Where can you learn more? Go to [email or web address] org and sign in to your [employer] account. Enter G551 in the [employer] box to learn more about \"Learning About Vision Tests. \"     If you do not have an account, please click on the \"Sign Up Now\" link. Current as of: January 24, 2022               Content Version: 13.4  © 2006-2022 Healthwise, [employer]. Care instructions adapted under license by [facility] ([facility]). If you have questions about a medical condition or this instruction, always ask your healthcare professional. [place] [address] any warranty or liability for your use of this information. Advance Directives: Care Instructions  Overview  An advance directive is a legal way to state your wishes at the end of your life. It tells your family and your doctor what to do if you can't say what you want. There are two main types of advance directives. You can change them any time your wishes change. Living will. This form tells your family and your doctor your wishes about life support and other treatment. The form is also called a declaration. Medical power of . This form lets you name a person to make treatment decisions for you when you can't speak for yourself. This person is called a health care agent (health care proxy, health care surrogate). The form is also called a durable power of  for health care.   If you do not have an advance directive, decisions about your medical care may be made by a family member, or by a doctor or a  who doesn't know you. It may help to think of an advance directive as a gift to the people who care for you. If you have one, they won't have to make tough decisions by themselves. Follow-up care is a key part of your treatment and safety. Be sure to make and go to all appointments, and call your doctor if you are having problems. It's also a good idea to know your test results and keep a list of the medicines you take. What should you include in an advance directive? Many states have a unique advance directive form. (It may ask you to address specific issues.) Or you might use a universal form that's approved by many states. If your form doesn't tell you what to address, it may be hard to know what to include in your advance directive. Use the questions below to help you get started. Who do you want to make decisions about your medical care if you are not able to? What life-support measures do you want if you have a serious illness that gets worse over time or can't be cured? What are you most afraid of that might happen? (Maybe you're afraid of having pain, losing your independence, or being kept alive by machines.)  Where would you prefer to die? (Your home? A hospital? A nursing home?)  Do you want to donate your organs when you die? Do you want certain Anglican practices performed before you die? When should you call for help? Be sure to contact your doctor if you have any questions. Where can you learn more? Go to [email or web address] and sign in to your [employer] account. Enter R264 in the [facility] box to learn more about \"Advance Directives: Care Instructions. \"     If you do not have an account, please click on the \"Sign Up Now\" link. Current as of: June 16, 2022               Content Version: 13.4  © 3601-5035 Healthwise, [employer]. Care instructions adapted under license by [facility] ([facility]).  If you have questions about a medical condition or this instruction, always ask your healthcare professional. [place] [address] any warranty or liability for your use of this information. Personalized Preventive Plan for [place] - 12/8/2022  Medicare offers a range of preventive health benefits. Some of the tests and screenings are paid in full while other may be subject to a deductible, co-insurance, and/or copay. Some of these benefits include a comprehensive review of your medical history including lifestyle, illnesses that may run in your family, and various assessments and screenings as appropriate. After reviewing your medical record and screening and assessments performed today your provider may have ordered immunizations, labs, imaging, and/or referrals for you. A list of these orders (if applicable) as well as your Preventive Care list are included within your After Visit Summary for your review. Other Preventive Recommendations:    A preventive eye exam performed by an eye specialist is recommended every 1-2 years to screen for glaucoma; cataracts, macular degeneration, and other eye disorders. A preventive dental visit is recommended every 6 months. Try to get at least 150 minutes of exercise per week or 10,000 steps per day on a pedometer . Order or download the FREE \"Exercise & Physical Activity: Your Everyday Guide\" from [employer]. Call 4-485.249.6934 or search [employer] online. You need 1130-7976 mg of calcium and 2094-7876 IU of vitamin D per day. It is possible to meet your calcium requirement with diet alone, but a vitamin D supplement is usually necessary to meet this goal.  When exposed to the sun, use a sunscreen that protects against both UVA and UVB radiation with an SPF of 30 or greater. Reapply every 2 to 3 hours or after sweating, drying off with a towel, or swimming. Always wear a seat belt when traveling in a car.  Always wear a helmet when riding a bicycle or motorcycle.

## 2022-12-08 NOTE — PROGRESS NOTES
Medicare Annual Wellness Visit    Cisco Brush is here for Medicare AWV (Pt had labs previously)    Assessment & Plan   Initial Medicare annual wellness visit      Recommendations for Preventive Services Due: see orders and patient instructions/AVS.  Recommended screening schedule for the next 5-10 years is provided to the patient in written form: see Patient Instructions/AVS.     Return in 1 year (on 12/8/2023) for Medicare Annual Wellness Visit in 1 year, AWV. Subjective   Patient here for AWV. She informs me that she discontinued Zetia due to side effects of myalgia. She declines taking any further prescription medications for hyperlipidemia. Patient's complete Health Risk Assessment and screening values have been reviewed and are found in Flowsheets. The following problems were reviewed today and where indicated follow up appointments were made and/or referrals ordered. Positive Risk Factor Screenings with Interventions:                 Weight and Activity:  Physical Activity: Unknown    Days of Exercise per Week: 1 day    Minutes of Exercise per Session: Not on file     On average, how many days per week do you engage in moderate to strenuous exercise (like a brisk walk)?: 1 day  Have you lost any weight without trying in the past 3 months?: No  Body mass index: (!) 30.82    Obesity Interventions:  Advised watching diet. Moderate intensity cardiovascular exercise minimum 150 minutes weekly. Unintentional Weight Loss Interventions:  N/A        Vision Screen:  Do you have difficulty driving, watching TV, or doing any of your daily activities because of your eyesight?: No  Have you had an eye exam within the past year?: (!) No  No results found.     Interventions:   Patient encouraged to make appointment with their eye specialist    Safety:  Do you have either shower bars, grab bars, non-slip mats or non-slip surfaces in your shower or bathtub?: (!) No  Interventions:  Patient declined any further interventions or treatment     Advanced Directives:  Do you have a Living Will?: (!) No    Intervention:                         Objective   Vitals:    12/08/22 1002   BP: 120/82   Position: Sitting   Pulse: 70   Temp: 97.4 °F (36.3 °C)   TempSrc: Temporal   SpO2: 100%   Weight: 174 lb (78.9 kg)   Height: 5' 3\" (1.6 m)      Body mass index is 30.82 kg/m². Physical Exam  General Appearance: alert and oriented to person, place and time, well-developed and well-nourished, in no acute distress  Skin: warm and dry, no rash or erythema  Head: normocephalic and atraumatic  Eyes: pupils equal, round, and reactive to light, extraocular eye movements intact, conjunctivae normal  ENT: tympanic membrane, external ear and ear canal normal bilaterally, oropharynx clear and moist with normal mucous membranes  Neck: neck supple and non tender without mass, no thyromegaly or thyroid nodules, no cervical lymphadenopathy   Pulmonary/Chest: clear to auscultation bilaterally- no wheezes, rales or rhonchi, normal air movement, no respiratory distress  Cardiovascular: normal rate, regular rhythm, normal S1 and S2, and systolic murmur present- 2/6 at 2nd left intercostal space  Abdomen: soft, non-tender, non-distended, normal bowel sounds, no masses or organomegaly  Extremities: no cyanosis, clubbing or edema  Neurologic: no cranial nerve deficit, gait and coordination normal, and speech normal       Allergies   Allergen Reactions    Latex      Hives and itching    Ezetimibe Other (See Comments)    Flecainide Other (See Comments)     Numbness and tingling in fingers    Fluconazole In Dextrose      Not to take with rythmol   Almost passed out rapid heart beat    Krill Oil Swelling    Other      TAMACOR    Simvastatin Other (See Comments)     achey    Tambocor [Flecainide Acetate]      Side affect numbness tingling hands and feet     Prior to Visit Medications    Medication Sig Taking?  Authorizing Provider Cholecalciferol 50 MCG (2000 UT) TABS Take 2,000 Units by mouth daily Yes Historical Provider, MD   metoprolol succinate (TOPROL XL) 25 MG extended release tablet TAKE ONE TABLET BY MOUTH EVERY DAY Yes Azael Spears DO   lisinopril (PRINIVIL;ZESTRIL) 2.5 MG tablet TAKE ONE TABLET BY MOUTH EVERY DAY Yes Azael Spears DO   furosemide (LASIX) 20 MG tablet Take 1 tablet by mouth daily Yes Azael Spears DO   aspirin-acetaminophen-caffeine (EXCEDRIN MIGRAINE) 805-053-52 MG per tablet Take 1 tablet by mouth every 6 hours as needed for Headaches Patient takes as needed for joint pain and headaches. Yes Historical Provider, MD   Selenium 200 MCG CAPS Take 200 mg by mouth 2 times daily Yes Historical Provider, MD   calcium carbonate (OSCAL) 500 MG TABS tablet Take 500 mg by mouth daily. Yes Historical Provider, MD   ezetimibe (ZETIA) 10 MG tablet TAKE ONE TABLET BY MOUTH EVERY DAY  Patient not taking: No sig reported  Historical Provider, MD     Plan:  Due for Shingrix vaccination. Cologuard reviewed 6/13/2022-negative. Patient declines any specific treatment for her hyperlipidemia. We will continue diet and exercise. Return for AWV. Repeat labs annually. Mammogram scheduled 10/19/2022.     CareTeam (Including outside providers/suppliers regularly involved in providing care):   Patient Care Team:  Azael Spears DO as PCP - Jesús Sterling DO as PCP - [facility] Empaneled Provider     Reviewed and updated this visit:  Tobacco  Allergies  Meds  Problems  Med Hx  Surg Hx  Soc Hx  Fam Hx

## 2022-12-19 ENCOUNTER — HOSPITAL ENCOUNTER (OUTPATIENT)
Dept: GENERAL RADIOLOGY | Age: 67
Discharge: HOME | End: 2022-12-21
Payer: MEDICARE

## 2022-12-19 DIAGNOSIS — Z12.31 SCREENING MAMMOGRAM FOR HIGH-RISK PATIENT: ICD-10-CM

## 2022-12-19 PROCEDURE — 77067 SCR MAMMO BI INCL CAD: CPT

## 2023-01-04 DIAGNOSIS — Z12.31 SCREENING MAMMOGRAM FOR HIGH-RISK PATIENT: Primary | ICD-10-CM

## 2023-01-18 RX ORDER — LISINOPRIL 2.5 MG/1
TABLET ORAL
Qty: 30 TABLET | Refills: 5 | Status: SHIPPED
Start: 2023-01-18 | End: 2023-03-03

## 2023-03-03 RX ORDER — LISINOPRIL 2.5 MG/1
TABLET ORAL
Qty: 90 TABLET | Refills: 3 | Status: SHIPPED | OUTPATIENT
Start: 2023-03-03

## 2023-03-20 ENCOUNTER — TELEPHONE (OUTPATIENT)
Dept: PRIMARY CARE CLINIC | Age: 68
End: 2023-03-20

## 2023-03-20 NOTE — TELEPHONE ENCOUNTER
Pc from pt states she has been seeing a chiropractor Dr Evelio Dawn who request pt have x-rays abel hips and lumbar spine however would like PCP to order because Medicare won't pay for it if he orders it.  Dx per pt bursitis

## 2023-03-21 ENCOUNTER — HOSPITAL ENCOUNTER (OUTPATIENT)
Dept: GENERAL RADIOLOGY | Age: 68
Discharge: HOME | End: 2023-03-23
Payer: MEDICARE

## 2023-03-21 ENCOUNTER — HOSPITAL ENCOUNTER (OUTPATIENT)
Age: 68
Discharge: HOME | End: 2023-03-23
Payer: MEDICARE

## 2023-03-21 DIAGNOSIS — M99.03 SOMATIC DYSFUNCTION OF SPINE, LUMBAR: ICD-10-CM

## 2023-03-21 DIAGNOSIS — M70.71 BURSITIS OF BOTH HIPS, UNSPECIFIED BURSA: ICD-10-CM

## 2023-03-21 DIAGNOSIS — M70.72 BURSITIS OF BOTH HIPS, UNSPECIFIED BURSA: ICD-10-CM

## 2023-03-21 DIAGNOSIS — M70.72 BURSITIS OF BOTH HIPS, UNSPECIFIED BURSA: Primary | ICD-10-CM

## 2023-03-21 DIAGNOSIS — M70.71 BURSITIS OF BOTH HIPS, UNSPECIFIED BURSA: Primary | ICD-10-CM

## 2023-03-21 PROCEDURE — 73521 X-RAY EXAM HIPS BI 2 VIEWS: CPT

## 2023-03-21 PROCEDURE — 72100 X-RAY EXAM L-S SPINE 2/3 VWS: CPT

## 2023-04-28 SDOH — HEALTH STABILITY: PHYSICAL HEALTH: ON AVERAGE, HOW MANY MINUTES DO YOU ENGAGE IN EXERCISE AT THIS LEVEL?: 30 MIN

## 2023-04-28 SDOH — HEALTH STABILITY: PHYSICAL HEALTH: ON AVERAGE, HOW MANY DAYS PER WEEK DO YOU ENGAGE IN MODERATE TO STRENUOUS EXERCISE (LIKE A BRISK WALK)?: 3 DAYS

## 2023-04-28 ASSESSMENT — SOCIAL DETERMINANTS OF HEALTH (SDOH)

## 2023-05-01 ENCOUNTER — OFFICE VISIT (OUTPATIENT)
Dept: ORTHOPEDIC SURGERY | Age: 68
End: 2023-05-01
Payer: MEDICARE

## 2023-05-01 VITALS — WEIGHT: 166 LBS | TEMPERATURE: 98 F | BODY MASS INDEX: 29.41 KG/M2 | HEIGHT: 63 IN

## 2023-05-01 DIAGNOSIS — M70.61 TROCHANTERIC BURSITIS OF BOTH HIPS: Primary | ICD-10-CM

## 2023-05-01 DIAGNOSIS — M70.62 TROCHANTERIC BURSITIS OF BOTH HIPS: Primary | ICD-10-CM

## 2023-05-01 PROCEDURE — 1036F TOBACCO NON-USER: CPT | Performed by: ORTHOPAEDIC SURGERY

## 2023-05-01 PROCEDURE — 1123F ACP DISCUSS/DSCN MKR DOCD: CPT | Performed by: ORTHOPAEDIC SURGERY

## 2023-05-01 PROCEDURE — G8399 PT W/DXA RESULTS DOCUMENT: HCPCS | Performed by: ORTHOPAEDIC SURGERY

## 2023-05-01 PROCEDURE — 1090F PRES/ABSN URINE INCON ASSESS: CPT | Performed by: ORTHOPAEDIC SURGERY

## 2023-05-01 PROCEDURE — 20610 DRAIN/INJ JOINT/BURSA W/O US: CPT | Performed by: ORTHOPAEDIC SURGERY

## 2023-05-01 PROCEDURE — 3017F COLORECTAL CA SCREEN DOC REV: CPT | Performed by: ORTHOPAEDIC SURGERY

## 2023-05-01 PROCEDURE — G8427 DOCREV CUR MEDS BY ELIG CLIN: HCPCS | Performed by: ORTHOPAEDIC SURGERY

## 2023-05-01 PROCEDURE — 99203 OFFICE O/P NEW LOW 30 MIN: CPT | Performed by: ORTHOPAEDIC SURGERY

## 2023-05-01 PROCEDURE — G8417 CALC BMI ABV UP PARAM F/U: HCPCS | Performed by: ORTHOPAEDIC SURGERY

## 2023-05-01 RX ORDER — TRIAMCINOLONE ACETONIDE 40 MG/ML
40 INJECTION, SUSPENSION INTRA-ARTICULAR; INTRAMUSCULAR ONCE
Status: COMPLETED | OUTPATIENT
Start: 2023-05-01 | End: 2023-05-01

## 2023-05-01 RX ADMIN — TRIAMCINOLONE ACETONIDE 40 MG: 40 INJECTION, SUSPENSION INTRA-ARTICULAR; INTRAMUSCULAR at 10:05

## 2023-05-01 RX ADMIN — TRIAMCINOLONE ACETONIDE 40 MG: 40 INJECTION, SUSPENSION INTRA-ARTICULAR; INTRAMUSCULAR at 10:06

## 2023-05-01 NOTE — PROGRESS NOTES
Chief Complaint   Patient presents with    Hip Pain     Bilateral Hip x couple months, no known injury, no previous hip or low back surgery. L>R, states of lateral hip pain. Brenda Magdaleno  Is a 79 y.o.  female who presents today complaining of bilateral hip pain R>L. She states that the pain began 3  month(s) ago. She did not have a history of injury. Patients states pain is located lateral, over greater trochanter, anterior and has tenderness over the  Greater trochanter and Anterior portion of the hip. Hip pain is described as aching, sharp, shooting, and stabbing and  is worse with weight bearing along with diffuse discomfort. She states the pain occurs in the  no apparent pattern. Patient states hip pain is relieved by rest, heat, ice, medication: NSAID used but not effective. Patient does not have a history of back pain. The patient does not use ambulatory aid. 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

## 2023-07-17 NOTE — Clinical Note
RTC 1 yr Abbe Flap (Upper To Lower Lip) Text: The defect of the lower lip was assessed and measured.  Given the location and size of the defect, an Abbe flap was deemed most appropriate.  Using a sterile surgical marker, an appropriate Abbe flap was measured and drawn on the upper lip. Local anesthesia was then infiltrated.  A scalpel was then used to incise the upper lip through and through the skin, vermilion, muscle and mucosa, leaving the flap pedicled on the opposite side.  The flap was then rotated and transferred to the lower lip defect.  The flap was then sutured into place with a three layer technique, closing the orbicularis oris muscle layer with subcutaneous buried sutures, followed by a mucosal layer and an epidermal layer.

## 2023-07-18 ENCOUNTER — OFFICE VISIT (OUTPATIENT)
Dept: ORTHOPEDIC SURGERY | Age: 68
End: 2023-07-18
Payer: MEDICARE

## 2023-07-18 VITALS — BODY MASS INDEX: 29.41 KG/M2 | TEMPERATURE: 98 F | WEIGHT: 166 LBS | HEIGHT: 63 IN

## 2023-07-18 DIAGNOSIS — S76.012A TEAR OF LEFT GLUTEUS MEDIUS TENDON, INITIAL ENCOUNTER: Primary | ICD-10-CM

## 2023-07-18 PROCEDURE — G8427 DOCREV CUR MEDS BY ELIG CLIN: HCPCS | Performed by: ORTHOPAEDIC SURGERY

## 2023-07-18 PROCEDURE — G8399 PT W/DXA RESULTS DOCUMENT: HCPCS | Performed by: ORTHOPAEDIC SURGERY

## 2023-07-18 PROCEDURE — 1123F ACP DISCUSS/DSCN MKR DOCD: CPT | Performed by: ORTHOPAEDIC SURGERY

## 2023-07-18 PROCEDURE — G8417 CALC BMI ABV UP PARAM F/U: HCPCS | Performed by: ORTHOPAEDIC SURGERY

## 2023-07-18 PROCEDURE — 1036F TOBACCO NON-USER: CPT | Performed by: ORTHOPAEDIC SURGERY

## 2023-07-18 PROCEDURE — 3017F COLORECTAL CA SCREEN DOC REV: CPT | Performed by: ORTHOPAEDIC SURGERY

## 2023-07-18 PROCEDURE — 99213 OFFICE O/P EST LOW 20 MIN: CPT | Performed by: ORTHOPAEDIC SURGERY

## 2023-07-18 PROCEDURE — 1090F PRES/ABSN URINE INCON ASSESS: CPT | Performed by: ORTHOPAEDIC SURGERY

## 2023-07-18 NOTE — PROGRESS NOTES
Chief Complaint   Patient presents with    Hip Pain     Bilateral Hip F/U, L>R, had cortisone injection on 05/01/2023 with good relief on the right and minimal relief on the left. Samantha Elena  Is a 79 y.o.  female who follows up today with her bilateral hip pain. Her right hip is feeling good, but she does have continued pain in the left hip.       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    Splenic artery aneurysm (HCC) 05/21/2013    Vertigo x1 after recent sinusitis    Vitamin D deficiency 12/03/2021     Past Surgical History:   Procedure Laterality Date    ATRIAL ABLATION SURGERY      BREAST BIOPSY Left     BREAST LUMPECTOMY Left     BREAST SURGERY      lumpectomy + lymph node dissection    EYE SURGERY Left     muscle surgery    HYSTERECTOMY (CERVIX STATUS UNKNOWN)         Current Outpatient Medications:     lisinopril (PRINIVIL;ZESTRIL) 2.5 MG tablet, TAKE ONE TABLET BY MOUTH EVERY DAY, Disp: 90 tablet, Rfl: 3    Cholecalciferol 50 MCG (2000 UT) TABS, Take 1 tablet by mouth daily, Disp: , Rfl:     metoprolol succinate (TOPROL XL) 25 MG extended release tablet, TAKE ONE TABLET BY MOUTH EVERY DAY, Disp: 90 tablet, Rfl: 3    furosemide (LASIX) 20 MG tablet, Take 1 tablet by mouth daily, Disp: 90 tablet, Rfl: 1    aspirin-acetaminophen-caffeine (EXCEDRIN MIGRAINE) 250-250-65 MG per tablet, Take 1 tablet by mouth every 6 hours as needed for Headaches Patient takes as needed for joint pain and headaches., Disp: , Rfl:     Selenium 200 MCG CAPS, Take 200 mg by mouth 2 times daily, Disp:

## 2023-07-21 ENCOUNTER — TELEPHONE (OUTPATIENT)
Dept: ADMINISTRATIVE | Age: 68
End: 2023-07-21

## 2023-07-21 NOTE — TELEPHONE ENCOUNTER
Ient is scheduled for her MRI today and needing a follow up with Dr. Corrine Rapp.  Next available in chart currently 8/21, please advise if okay to wait that long

## 2023-08-02 ENCOUNTER — OFFICE VISIT (OUTPATIENT)
Dept: ONCOLOGY | Age: 68
End: 2023-08-02
Payer: MEDICARE

## 2023-08-02 ENCOUNTER — HOSPITAL ENCOUNTER (OUTPATIENT)
Dept: INFUSION THERAPY | Age: 68
Discharge: HOME | End: 2023-08-02
Payer: MEDICARE

## 2023-08-02 VITALS
WEIGHT: 170.6 LBS | BODY MASS INDEX: 30.23 KG/M2 | HEIGHT: 63 IN | OXYGEN SATURATION: 97 % | SYSTOLIC BLOOD PRESSURE: 178 MMHG | DIASTOLIC BLOOD PRESSURE: 72 MMHG | HEART RATE: 57 BPM | TEMPERATURE: 97.4 F

## 2023-08-02 DIAGNOSIS — E55.9 VITAMIN D DEFICIENCY: ICD-10-CM

## 2023-08-02 DIAGNOSIS — C50.912 STAGE 1 BREAST CANCER, ER+, LEFT (HCC): Primary | ICD-10-CM

## 2023-08-02 DIAGNOSIS — Z17.0 STAGE 1 BREAST CANCER, ER+, LEFT (HCC): Primary | ICD-10-CM

## 2023-08-02 DIAGNOSIS — C50.912 STAGE 1 BREAST CANCER, ER+, LEFT (HCC): ICD-10-CM

## 2023-08-02 DIAGNOSIS — Z17.0 STAGE 1 BREAST CANCER, ER+, LEFT (HCC): ICD-10-CM

## 2023-08-02 LAB
ALBUMIN SERPL-MCNC: 4.2 G/DL (ref 3.5–5.2)
ALP SERPL-CCNC: 58 U/L (ref 35–104)
ALT SERPL-CCNC: 14 U/L (ref 0–32)
ANION GAP SERPL CALCULATED.3IONS-SCNC: 8 MMOL/L (ref 7–16)
AST SERPL-CCNC: 22 U/L (ref 0–31)
BASOPHILS # BLD: 0.05 K/UL (ref 0–0.2)
BASOPHILS NFR BLD: 1 % (ref 0–2)
BILIRUB SERPL-MCNC: 0.4 MG/DL (ref 0–1.2)
BUN SERPL-MCNC: 17 MG/DL (ref 6–23)
CALCIUM SERPL-MCNC: 9.6 MG/DL (ref 8.6–10.2)
CHLORIDE SERPL-SCNC: 105 MMOL/L (ref 98–107)
CO2 SERPL-SCNC: 27 MMOL/L (ref 22–29)
CREAT SERPL-MCNC: 1 MG/DL (ref 0.5–1)
EOSINOPHIL # BLD: 0.21 K/UL (ref 0.05–0.5)
EOSINOPHILS RELATIVE PERCENT: 3 % (ref 0–6)
ERYTHROCYTE [DISTWIDTH] IN BLOOD BY AUTOMATED COUNT: 12.4 % (ref 11.5–15)
GFR SERPL CREATININE-BSD FRML MDRD: >60 ML/MIN/1.73M2
GLUCOSE SERPL-MCNC: 94 MG/DL (ref 74–99)
HCT VFR BLD AUTO: 40.6 % (ref 34–48)
HGB BLD-MCNC: 13.1 G/DL (ref 11.5–15.5)
IMM GRANULOCYTES # BLD AUTO: <0.03 K/UL (ref 0–0.58)
IMM GRANULOCYTES NFR BLD: 0 % (ref 0–5)
LYMPHOCYTES NFR BLD: 1.29 K/UL (ref 1.5–4)
LYMPHOCYTES RELATIVE PERCENT: 19 % (ref 20–42)
MCH RBC QN AUTO: 30.4 PG (ref 26–35)
MCHC RBC AUTO-ENTMCNC: 32.3 G/DL (ref 32–34.5)
MCV RBC AUTO: 94.2 FL (ref 80–99.9)
MONOCYTES NFR BLD: 0.56 K/UL (ref 0.1–0.95)
MONOCYTES NFR BLD: 8 % (ref 2–12)
NEUTROPHILS NFR BLD: 69 % (ref 43–80)
NEUTS SEG NFR BLD: 4.67 K/UL (ref 1.8–7.3)
PLATELET # BLD AUTO: 194 K/UL (ref 130–450)
PMV BLD AUTO: 10.8 FL (ref 7–12)
POTASSIUM SERPL-SCNC: 4.6 MMOL/L (ref 3.5–5)
PROT SERPL-MCNC: 6.7 G/DL (ref 6.4–8.3)
RBC # BLD AUTO: 4.31 M/UL (ref 3.5–5.5)
SODIUM SERPL-SCNC: 140 MMOL/L (ref 132–146)
WBC OTHER # BLD: 6.8 K/UL (ref 4.5–11.5)

## 2023-08-02 PROCEDURE — 85025 COMPLETE CBC W/AUTO DIFF WBC: CPT

## 2023-08-02 PROCEDURE — 80053 COMPREHEN METABOLIC PANEL: CPT

## 2023-08-02 PROCEDURE — 36415 COLL VENOUS BLD VENIPUNCTURE: CPT

## 2023-08-02 PROCEDURE — 99213 OFFICE O/P EST LOW 20 MIN: CPT

## 2023-08-22 ENCOUNTER — OFFICE VISIT (OUTPATIENT)
Dept: ORTHOPEDIC SURGERY | Age: 68
End: 2023-08-22

## 2023-08-22 VITALS — WEIGHT: 168 LBS | OXYGEN SATURATION: 98 % | HEIGHT: 63 IN | RESPIRATION RATE: 16 BRPM | BODY MASS INDEX: 29.77 KG/M2

## 2023-08-22 DIAGNOSIS — M70.62 TROCHANTERIC BURSITIS OF BOTH HIPS: Primary | ICD-10-CM

## 2023-08-22 DIAGNOSIS — M48.061 SPINAL STENOSIS OF LUMBAR REGION, UNSPECIFIED WHETHER NEUROGENIC CLAUDICATION PRESENT: ICD-10-CM

## 2023-08-22 DIAGNOSIS — M70.61 TROCHANTERIC BURSITIS OF BOTH HIPS: Primary | ICD-10-CM

## 2023-08-22 NOTE — PROGRESS NOTES
Chief Complaint   Patient presents with    Hip Pain     Had MRI done 7/21/23. Pain is about the same, worse when she sleeps on it. Janine Hill  Is a 76 y.o.  female who follows up today with her bilateral hip pain and MRI results of left hip. Her pain is the same as before.     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    Splenic artery aneurysm (HCC) 05/21/2013    Vertigo x1 after recent sinusitis    Vitamin D deficiency 12/03/2021     Past Surgical History:   Procedure Laterality Date    ATRIAL ABLATION SURGERY      BREAST BIOPSY Left     BREAST LUMPECTOMY Left     BREAST SURGERY      lumpectomy + lymph node dissection    EYE SURGERY Left     muscle surgery    HYSTERECTOMY (CERVIX STATUS UNKNOWN)         Current Outpatient Medications:     lisinopril (PRINIVIL;ZESTRIL) 2.5 MG tablet, TAKE ONE TABLET BY MOUTH EVERY DAY, Disp: 90 tablet, Rfl: 3    Cholecalciferol 50 MCG (2000 UT) TABS, Take 1 tablet by mouth daily, Disp: , Rfl:     metoprolol succinate (TOPROL XL) 25 MG extended release tablet, TAKE ONE TABLET BY MOUTH EVERY DAY, Disp: 90 tablet, Rfl: 3    furosemide (LASIX) 20 MG tablet, Take 1 tablet by mouth daily, Disp: 90 tablet, Rfl: 1    aspirin-acetaminophen-caffeine (EXCEDRIN MIGRAINE) 250-250-65 MG per tablet, Take 1 tablet by mouth every 6 hours as needed for Headaches Patient takes as needed for joint pain and headaches., Disp: , Rfl:     Selenium 200 MCG CAPS, Take 200 mg by mouth 2 times daily, Disp: , Rfl:     calcium carbonate (OSCAL) 500 MG TABS tablet, Take 1 tablet by

## 2023-08-23 RX ORDER — TRIAMCINOLONE ACETONIDE 40 MG/ML
40 INJECTION, SUSPENSION INTRA-ARTICULAR; INTRAMUSCULAR ONCE
Status: COMPLETED | OUTPATIENT
Start: 2023-08-23 | End: 2023-08-23

## 2023-08-23 RX ADMIN — TRIAMCINOLONE ACETONIDE 40 MG: 40 INJECTION, SUSPENSION INTRA-ARTICULAR; INTRAMUSCULAR at 16:08

## 2023-08-29 ENCOUNTER — EVALUATION (OUTPATIENT)
Dept: PHYSICAL THERAPY | Age: 68
End: 2023-08-29
Payer: MEDICARE

## 2023-08-29 DIAGNOSIS — M70.61 GREATER TROCHANTERIC BURSITIS OF BOTH HIPS: Primary | ICD-10-CM

## 2023-08-29 DIAGNOSIS — M70.62 GREATER TROCHANTERIC BURSITIS OF BOTH HIPS: Primary | ICD-10-CM

## 2023-08-29 DIAGNOSIS — M48.061 SPINAL STENOSIS, LUMBAR REGION, WITHOUT NEUROGENIC CLAUDICATION: ICD-10-CM

## 2023-08-29 PROCEDURE — 97163 PT EVAL HIGH COMPLEX 45 MIN: CPT | Performed by: PHYSICAL THERAPIST

## 2023-08-29 NOTE — PROGRESS NOTES
Physical Therapy Daily Treatment Note    Date: 2023  Patient Name: Betty Rodriguez  : [birth year]   MRN: [id number]  DOInjury: 2023 insidious   DOSx: -  Referring Provider: Ynes Stokes DO  90 [facility],  [address] [place] [address]     Medical Diagnosis:     M70.61, M70.62 (ICD-10-CM) - Trochanteric bursitis of both hips   M48.061 (ICD-10-CM) - Spinal stenosis of lumbar region, unspecified whether neurogenic claudication present       Toby Bryan has classic gluteal tendinopathy that is painful with walking and laying on L side. Both R and L are painful with L being worse. We will start a tendon loading program.    X = TO BE PERFORMED NEXT VISIT  > = PROGRESS TO THIS    S: See eval  O: Discussed anatomy, physiology, body mechanics, principles of loading, and progressive loading/activity. Time 0972-1954     Visit 1 Repeat outcome measure at mid point and end. Pain Pain with activity 3/10     ROM      Modalities         MO   Manual            Stretch      Towel / strap DF, INV, EV   TE      TE   Exercise      Hip ABD isometric x  TE   clamshell x        TE   QS   TE   SLR   TE   SAQ   TE   [] TG  [] Leg Press 2-leg   TE   [] TG  [] Leg Press 1-leg   TE   Hamstring Curl Machine   TE   Knee Extension Machine   TE   Step-ups - FWD   TA   Step-ups - LAT   TA   Step-ups - BWD   TA   Calf Raises   TA      TA      TA               A:  Tolerated well.     P: Continue with rehab plan  Louisa Kennedy PT    Treatment Charges: Mins Units   Initial Evaluation 45 1   Re-Evaluation     Ther Exercise         TE     Manual Therapy     MT     Ther Activities        TA     Gait Training          GT     Neuro Re-education NR     Modalities     Non-Billable Service Time     Other     Total Time/Units 45 1

## 2023-08-29 NOTE — PROGRESS NOTES
[facility] OUTPATIENT REHABILITATION  PHYSICAL THERAPY INITIAL EVALUATION         Date:  2023   Patient: Andrey Pearce  : 1955  MRN: [id number]  Referring Provider: DO Abraham Dorsey2 [address]     Medical Diagnosis:     M70.61, M70.62 (ICD-10-CM) - Trochanteric bursitis of both hips   M48.061 (ICD-10-CM) - Spinal stenosis of lumbar region, unspecified whether neurogenic claudication present       Physician Order: Eval and Treat     SUBJECTIVE:     Onset date: 2023    Onset: Insidious      Interventions for current problem: cortisone injections last week    Chief complaint: pain, difficulty walking    Behavior: condition is getting better    Pain:   Current: 0/10     Best: 0/10     Worst:5/10   Pain frequency: intermittent    Symptom Type / Quality: aching  Location[de-identified] Back: greater trochanter       Provoking Activities/Positions: walking, lying on right side, lying on left side                 Relieving Activitie/Positions: sitting    Disturbed Sleep: yes  Bladder Dysfunction: no  Bowel Dysfunction: no     Imaging results: No results found.     Past Medical History:  Past Medical History:   Diagnosis Date    Alopecia areata 2021    Aneurysm of splenic artery (HCC)     Atrial fibrillation (HCC)     BRCA1 negative     BRCA2 negative     Breast cancer ([address])     Cancer (HCC)     left breast    Graves' eye disease     left eye    History of tachycardia     History of therapeutic radiation     Hx antineoplastic chemo     Hyperlipidemia     Hypertension     Other congenital anomalies of pulmonary valve 2011    Splenic artery aneurysm (HCC) 2013    Vertigo x1 after recent sinusitis    Vitamin D deficiency 2021     Past Surgical History:   Procedure Laterality Date    ATRIAL ABLATION SURGERY      BREAST BIOPSY Left     BREAST LUMPECTOMY Left     BREAST SURGERY      lumpectomy + lymph node dissection    EYE SURGERY Left

## 2023-09-05 ENCOUNTER — TREATMENT (OUTPATIENT)
Dept: PHYSICAL THERAPY | Age: 68
End: 2023-09-05
Payer: MEDICARE

## 2023-09-05 DIAGNOSIS — M70.62 GREATER TROCHANTERIC BURSITIS OF BOTH HIPS: Primary | ICD-10-CM

## 2023-09-05 DIAGNOSIS — M70.61 GREATER TROCHANTERIC BURSITIS OF BOTH HIPS: Primary | ICD-10-CM

## 2023-09-05 DIAGNOSIS — M48.061 SPINAL STENOSIS, LUMBAR REGION, WITHOUT NEUROGENIC CLAUDICATION: ICD-10-CM

## 2023-09-05 PROCEDURE — 97110 THERAPEUTIC EXERCISES: CPT | Performed by: PHYSICAL THERAPIST

## 2023-09-05 NOTE — PROGRESS NOTES
Physical Therapy Daily Treatment Note    Date: 2023  Patient Name: Clayton Collins  : 1955   MRN: [id number]  DOInjury: 2023 insidious   DOSx: -  Referring Provider: Gray Corona DO  90 [facility],  [address] [place] [address]     Medical Diagnosis:     M70.61, M70.62 (ICD-10-CM) - Trochanteric bursitis of both hips   M48.061 (ICD-10-CM) - Spinal stenosis of lumbar region, unspecified whether neurogenic claudication present       Emely Kauffman has classic gluteal tendinopathy that is painful with walking and laying on L side. Both R and L are painful with L being worse. We will start a tendon loading program.    X = TO BE PERFORMED NEXT VISIT  > = PROGRESS TO THIS    S: See eval  O: Discussed anatomy, physiology, body mechanics, principles of loading, and progressive loading/activity. Time 7972-9338     Visit 2 Repeat outcome measure at mid point and end. Pain Pain with activity 3/10     ROM      Modalities         MO   Manual            Stretch      Towel / strap DF, INV, EV   TE      TE   Exercise      Hip ABD isometric 3 x 5 w/ 8 sec hold  TE   clamshell 3 x 10         TE   QS   TE   SLR   TE   SAQ   TE   [] TG  [] Leg Press 2-leg   TE   [] TG  [] Leg Press 1-leg   TE   Hamstring Curl Machine   TE   Knee Extension Machine   TE   Step-ups - FWD   TA   Step-ups - LAT   TA   Step-ups - BWD   TA   Calf Raises   TA      TA      TA               A:  Tolerated well.     P: Continue with rehab plan  Dejuan Hudson, PT    Treatment Charges: Mins Units   Initial Evaluation     Re-Evaluation     Ther Exercise         TE 30 2   Manual Therapy     MT     Ther Activities        TA     Gait Training          GT     Neuro Re-education NR     Modalities     Non-Billable Service Time     Other     Total Time/Units 30 2 We received med record request from [facility] for [address] [place] [address] Drive 5/27/21 to present including billing-sent to scan stat and scanning

## 2023-09-11 ENCOUNTER — TREATMENT (OUTPATIENT)
Dept: PHYSICAL THERAPY | Age: 68
End: 2023-09-11
Payer: MEDICARE

## 2023-09-11 DIAGNOSIS — M70.62 GREATER TROCHANTERIC BURSITIS OF BOTH HIPS: Primary | ICD-10-CM

## 2023-09-11 DIAGNOSIS — M48.061 SPINAL STENOSIS, LUMBAR REGION, WITHOUT NEUROGENIC CLAUDICATION: ICD-10-CM

## 2023-09-11 DIAGNOSIS — M70.61 GREATER TROCHANTERIC BURSITIS OF BOTH HIPS: Primary | ICD-10-CM

## 2023-09-11 PROCEDURE — 97110 THERAPEUTIC EXERCISES: CPT | Performed by: PHYSICAL THERAPIST

## 2023-09-11 NOTE — PROGRESS NOTES
Physical Therapy Daily Treatment Note    Date: 2023  Patient Name: Halle Rocha  : 1955   MRN: [id number]  DOInjury: 2023 insidious   DOSx: -  Referring Provider: Nannette Chin DO  90 [facility],  [address] [place] [address]     Medical Diagnosis:     M70.61, M70.62 (ICD-10-CM) - Trochanteric bursitis of both hips   M48.061 (ICD-10-CM) - Spinal stenosis of lumbar region, unspecified whether neurogenic claudication present       Shawn Moore has classic gluteal tendinopathy that is painful with walking and laying on L side. Both R and L are painful with L being worse. We will start a tendon loading program.    X = TO BE PERFORMED NEXT VISIT  > = PROGRESS TO THIS    S: reports she is 90% improved, doing ex at home  O: Discussed anatomy, physiology, body mechanics, principles of loading, and progressive loading/activity. Time 4675-7500     Visit 3 Repeat outcome measure at mid point and end. Pain Pain with activity 3/10     ROM      Modalities         MO   Manual            Stretch      Towel / strap DF, INV, EV   TE      TE   Exercise      Hip ABD isometric 3 x 5 w/ 8 sec hold 2lb cuff weight  bilateral 2 min rest between sets TE   clamshell 2 x 15 bilateral        TE   QS   TE   SLR   TE   SAQ   TE   [] TG  [] Leg Press 2-leg   TE   [] TG  [] Leg Press 1-leg   TE   Hamstring Curl Machine   TE   Knee Extension Machine   TE   Step-ups - FWD   TA   Step-ups - LAT   TA   Step-ups - BWD   TA   Calf Raises   TA      TA      TA               A:  Tolerated well.     P: Continue with rehab plan  Edmar He PT    Treatment Charges: Mins Units   Initial Evaluation     Re-Evaluation     Ther Exercise         TE 40 3   Manual Therapy     MT     Ther Activities        TA     Gait Training          GT     Neuro Re-education NR     Modalities     Non-Billable Service Time     Other     Total Time/Units 40 3

## 2023-09-15 ENCOUNTER — TREATMENT (OUTPATIENT)
Dept: PHYSICAL THERAPY | Age: 68
End: 2023-09-15

## 2023-09-15 DIAGNOSIS — M70.62 GREATER TROCHANTERIC BURSITIS OF BOTH HIPS: Primary | ICD-10-CM

## 2023-09-15 DIAGNOSIS — M48.061 SPINAL STENOSIS, LUMBAR REGION, WITHOUT NEUROGENIC CLAUDICATION: ICD-10-CM

## 2023-09-15 DIAGNOSIS — M70.61 GREATER TROCHANTERIC BURSITIS OF BOTH HIPS: Primary | ICD-10-CM

## 2023-09-15 NOTE — PROGRESS NOTES
Physical Therapy Daily Treatment Note    Date: 9/15/2023  Patient Name: Yuly Oconnell  : 1955   MRN: [id number]  DOInjury: 2023 insidious   DOSx: -  Referring Provider: Jyothi Bonilla DO  90 [facility],  [address] [place] [address]     Medical Diagnosis:     M70.61, M70.62 (ICD-10-CM) - Trochanteric bursitis of both hips   M48.061 (ICD-10-CM) - Spinal stenosis of lumbar region, unspecified whether neurogenic claudication present       Nina Haider has classic gluteal tendinopathy that is painful with walking and laying on L side. Both R and L are painful with L being worse. We will start a tendon loading program.    X = TO BE PERFORMED NEXT VISIT  > = PROGRESS TO THIS    S: reports she is 90% improved, doing ex at home  O: Discussed anatomy, physiology, body mechanics, principles of loading, and progressive loading/activity. Time 5909-2661     Visit 3 Repeat outcome measure at mid point and end. Pain Pain with activity 3/10     ROM      Modalities         MO   Manual            Stretch      Towel / strap DF, INV, EV   TE      TE   Exercise      Hip ABD isometric 3 x 5 w/ 8 sec hold 2lb cuff weight  bilateral 2 min rest between sets TE   clamshell 2 x 15 bilateral        TE   QS   TE   SLR   TE   SAQ   TE   [] TG  [] Leg Press 2-leg   TE   [] TG  [] Leg Press 1-leg   TE   Hamstring Curl Machine   TE   Knee Extension Machine   TE   Step-ups - FWD   TA   Step-ups - LAT   TA   Step-ups - BWD   TA   Calf Raises   TA      TA      TA               A:  Tolerated well.     P: Continue with rehab plan  Luis Eduardo Roberto PT    Treatment Charges: Mins Units   Initial Evaluation     Re-Evaluation     Ther Exercise         TE 40 3   Manual Therapy     MT     Ther Activities        TA     Gait Training          GT     Neuro Re-education NR     Modalities     Non-Billable Service Time     Other     Total Time/Units 40 3

## 2023-09-21 ENCOUNTER — TREATMENT (OUTPATIENT)
Dept: PHYSICAL THERAPY | Age: 68
End: 2023-09-21

## 2023-09-21 DIAGNOSIS — M70.61 GREATER TROCHANTERIC BURSITIS OF BOTH HIPS: Primary | ICD-10-CM

## 2023-09-21 DIAGNOSIS — M48.061 SPINAL STENOSIS, LUMBAR REGION, WITHOUT NEUROGENIC CLAUDICATION: ICD-10-CM

## 2023-09-21 DIAGNOSIS — M70.62 GREATER TROCHANTERIC BURSITIS OF BOTH HIPS: Primary | ICD-10-CM

## 2023-09-21 NOTE — PROGRESS NOTES
Physical Therapy Daily Treatment Note    Date: 2023  Patient Name: Cristhian Landin  : 1955   MRN: [id number]  DOInjury: 2023 insidious   DOSx: -  Referring Provider: He Client, DO  90 [facility],  [address] [place] [address]     Medical Diagnosis:     M70.61, M70.62 (ICD-10-CM) - Trochanteric bursitis of both hips   M48.061 (ICD-10-CM) - Spinal stenosis of lumbar region, unspecified whether neurogenic claudication present       Catherine Banegas has classic gluteal tendinopathy that is painful with walking and laying on L side. Both R and L are painful with L being worse. We will start a tendon loading program.    X = TO BE PERFORMED NEXT VISIT  > = PROGRESS TO THIS    S: reports she is 90% improved, doing ex at home  O: Discussed anatomy, physiology, body mechanics, principles of loading, and progressive loading/activity. Time 6684-9172     Visit 3 Repeat outcome measure at mid point and end. Pain Pain with activity 3/10     ROM      Modalities         MO   Manual            Stretch      Towel / strap DF, INV, EV   TE      TE   Exercise      Hip ABD isometric 3 x 5 w/ 8 sec hold 3lb cuff weight  bilateral 2 min rest between sets TE   clamshell 2 x 15 bilateral        TE   Mid rows x  TE   Low rows x  TE   DB alt press x  TE   [] TG  [] Leg Press 2-leg   TE   [] TG  [] Leg Press 1-leg   TE   Hamstring Curl Machine   TE   Knee Extension Machine   TE   Step-ups - FWD   TA   Step-ups - LAT   TA   Step-ups - BWD   TA   Calf Raises   TA      TA      TA               A:  Tolerated well.     P: Continue with rehab plan  Se Oh PT    Treatment Charges: Mins Units   Initial Evaluation     Re-Evaluation     Ther Exercise         TE 40 3   Manual Therapy     MT     Ther Activities        TA     Gait Training          GT     Neuro Re-education NR     Modalities     Non-Billable Service Time     Other     Total Time/Units 40 3

## 2023-09-27 ENCOUNTER — TREATMENT (OUTPATIENT)
Dept: PHYSICAL THERAPY | Age: 68
End: 2023-09-27
Payer: MEDICARE

## 2023-09-27 DIAGNOSIS — M48.061 SPINAL STENOSIS, LUMBAR REGION, WITHOUT NEUROGENIC CLAUDICATION: ICD-10-CM

## 2023-09-27 DIAGNOSIS — M70.62 GREATER TROCHANTERIC BURSITIS OF BOTH HIPS: Primary | ICD-10-CM

## 2023-09-27 DIAGNOSIS — M70.61 GREATER TROCHANTERIC BURSITIS OF BOTH HIPS: Primary | ICD-10-CM

## 2023-09-27 PROCEDURE — 97110 THERAPEUTIC EXERCISES: CPT

## 2023-09-27 NOTE — PROGRESS NOTES
Physical Therapy Daily Treatment Note    Date: 2023  Patient Name: Shelbie Aden  : 1955   MRN: [id number]  DOInjury: 2023 insidious   DOSx: -  Referring Provider: Darren Chakraborty DO  [facility],  [address] [place] [address]     Medical Diagnosis:     M70.61, M70.62 (ICD-10-CM) - Trochanteric bursitis of both hips   M48.061 (ICD-10-CM) - Spinal stenosis of lumbar region, unspecified whether neurogenic claudication present       Arabella Aguilar has classic gluteal tendinopathy that is painful with walking and laying on L side. Both R and L are painful with L being worse. We will start a tendon loading program.    X = TO BE PERFORMED NEXT VISIT  > = PROGRESS TO THIS    S  : pt reports she is feeling about the same . O: Discussed anatomy, physiology, body mechanics, principles of loading, and progressive loading/activity. Time 3209-3924     Visit  7/  Repeat outcome measure at mid point and end. Pain Pain with activity 3/10     ROM      Modalities         MO   Manual            Stretch      Towel / strap DF, INV, EV   TE      TE   Exercise      Hip ABD isometric 3 x 5 w/ 8 sec hold 3lb cuff weight  bilateral 2 min rest between sets TE   clamshell 2 x 15 bilateral        TE   Mid rows Green 3 x 15   TE   Low rows Green 3 x 15   TE   DB alt press x  TE   [] TG  [] Leg Press 2-leg   TE   [] TG  [] Leg Press 1-leg   TE   Hamstring Curl Machine   TE   Knee Extension Machine   TE   Step-ups - FWD   TA   Step-ups - LAT   TA   Step-ups - BWD   TA   Calf Raises   TA      TA      TA               A:  Tolerated well. Pt able to progress with newly added rowing ex's as listed.     P: Continue with rehab plan  Alana Atkins PTA    Treatment Charges: Mins Units   Initial Evaluation     Re-Evaluation     Ther Exercise         TE 45 3   Manual Therapy     MT     Ther Activities        TA     Gait Training          GT     Neuro Re-education NR     Modalities     Non-Billable Service Time     Other     Total

## 2023-09-30 ENCOUNTER — APPOINTMENT (OUTPATIENT)
Dept: GENERAL RADIOLOGY | Age: 68
End: 2023-09-30
Payer: MEDICARE

## 2023-09-30 ENCOUNTER — HOSPITAL ENCOUNTER (EMERGENCY)
Age: 68
Discharge: HOME | End: 2023-09-30
Payer: MEDICARE

## 2023-09-30 VITALS
SYSTOLIC BLOOD PRESSURE: 151 MMHG | OXYGEN SATURATION: 97 % | TEMPERATURE: 98.1 F | DIASTOLIC BLOOD PRESSURE: 83 MMHG | WEIGHT: 167 LBS | HEART RATE: 70 BPM | BODY MASS INDEX: 29.58 KG/M2 | RESPIRATION RATE: 16 BRPM

## 2023-09-30 DIAGNOSIS — M20.019 MALLET FINGER, UNSPECIFIED LATERALITY: Primary | ICD-10-CM

## 2023-09-30 PROCEDURE — 99211 OFF/OP EST MAY X REQ PHY/QHP: CPT

## 2023-09-30 PROCEDURE — 73130 X-RAY EXAM OF HAND: CPT

## 2023-09-30 PROCEDURE — 29130 APPL FINGER SPLINT STATIC: CPT

## 2023-09-30 ASSESSMENT — PAIN DESCRIPTION - PAIN TYPE: TYPE: ACUTE PAIN

## 2023-09-30 ASSESSMENT — PAIN DESCRIPTION - LOCATION: LOCATION: HAND

## 2023-09-30 ASSESSMENT — PAIN SCALES - GENERAL: PAINLEVEL_OUTOF10: 4

## 2023-09-30 ASSESSMENT — PAIN DESCRIPTION - DESCRIPTORS: DESCRIPTORS: ACHING;SORE;DISCOMFORT

## 2023-09-30 ASSESSMENT — PAIN - FUNCTIONAL ASSESSMENT: PAIN_FUNCTIONAL_ASSESSMENT: 0-10

## 2023-09-30 ASSESSMENT — PAIN DESCRIPTION - ORIENTATION: ORIENTATION: RIGHT

## 2023-10-02 ENCOUNTER — TREATMENT (OUTPATIENT)
Dept: PHYSICAL THERAPY | Age: 68
End: 2023-10-02
Payer: MEDICARE

## 2023-10-02 ENCOUNTER — TELEPHONE (OUTPATIENT)
Dept: ORTHOPEDIC SURGERY | Age: 68
End: 2023-10-02

## 2023-10-02 DIAGNOSIS — M70.61 GREATER TROCHANTERIC BURSITIS OF BOTH HIPS: Primary | ICD-10-CM

## 2023-10-02 DIAGNOSIS — M70.62 GREATER TROCHANTERIC BURSITIS OF BOTH HIPS: Primary | ICD-10-CM

## 2023-10-02 DIAGNOSIS — M48.061 SPINAL STENOSIS, LUMBAR REGION, WITHOUT NEUROGENIC CLAUDICATION: ICD-10-CM

## 2023-10-02 PROCEDURE — 97110 THERAPEUTIC EXERCISES: CPT | Performed by: PHYSICAL THERAPIST

## 2023-10-02 NOTE — PROGRESS NOTES
Physical Therapy Daily Treatment Note    Date: 10/2/2023  Patient Name: Mackenzie Villalba  : 1955   MRN: [id number]  DOInjury: 2023 insidious   DOSx: -  Referring Provider: Kalina Taylor DO  90 [facility],  [address] [place] [address]     Medical Diagnosis:     M70.61, M70.62 (ICD-10-CM) - Trochanteric bursitis of both hips   M48.061 (ICD-10-CM) - Spinal stenosis of lumbar region, unspecified whether neurogenic claudication present       Catia Montgomery has classic gluteal tendinopathy that is painful with walking and laying on L side. Both R and L are painful with L being worse. We will start a tendon loading program.    X = TO BE PERFORMED NEXT VISIT  > = PROGRESS TO THIS    S  : pt reports she is feeling about the same . O: Discussed anatomy, physiology, body mechanics, principles of loading, and progressive loading/activity. Access Code: [email or web address]  URL: [email or web address]  Date: 10/02/2023  Prepared by: Julian Mckeon    Exercises  - Standing Row with Anchored Resistance  - 1 x daily - 7 x weekly - 3 sets - 15 reps  - Standing Bent Over Low Shoulder Row with Anchored Resistance  - 1 x daily - 7 x weekly - 3 sets - 15 reps    Time 9635-7804     Visit  7/  Repeat outcome measure at mid point and end. Pain Pain with activity 3/10     ROM      Modalities         MO   Manual            Stretch      Towel / strap DF, INV, EV   TE      TE   Exercise      Hip ABD isometric 3 x 5 w/ 8 sec hold 4lb cuff weight  bilateral 2 min rest between sets TE   clamshell 2 x 15 bilateral        TE   Mid rows Blue 3 x 15   TE   Low rows Blue 3 x 15   TE   DB alt press x  TE   [] TG  [] Leg Press 2-leg   TE   [] TG  [] Leg Press 1-leg   TE   Hamstring Curl Machine   TE   Knee Extension Machine   TE   Step-ups - FWD   TA   Step-ups - LAT   TA   Step-ups - BWD   TA   Calf Raises   TA      TA      TA               A:  Tolerated well. Pt able to progress with newly added rowing ex's as listed.     P: Continue with

## 2023-10-02 NOTE — TELEPHONE ENCOUNTER
Pt was seen in the ED on 9/30 for Mallet finger, unspecified laterality. She is a current pt of Dr Jairo Munoz and would like to see him for this DX also. She said the ED told her to follow up in 1 week. Please advise on scheduling.

## 2023-10-05 ENCOUNTER — TREATMENT (OUTPATIENT)
Dept: PHYSICAL THERAPY | Age: 68
End: 2023-10-05

## 2023-10-05 DIAGNOSIS — M48.061 SPINAL STENOSIS, LUMBAR REGION, WITHOUT NEUROGENIC CLAUDICATION: ICD-10-CM

## 2023-10-05 DIAGNOSIS — M70.62 GREATER TROCHANTERIC BURSITIS OF BOTH HIPS: Primary | ICD-10-CM

## 2023-10-05 DIAGNOSIS — M70.61 GREATER TROCHANTERIC BURSITIS OF BOTH HIPS: Primary | ICD-10-CM

## 2023-10-05 NOTE — PROGRESS NOTES
Physical Therapy Daily Treatment Note    Date: 10/5/2023  Patient Name: Ally Bellamy  : 1955   MRN: [id number]  DOInjury: 2023 insidious   DOSx: -  Referring Provider: Newton Spears, [facility],  [address] [place] [address]     Medical Diagnosis:     M70.61, M70.62 (ICD-10-CM) - Trochanteric bursitis of both hips   M48.061 (ICD-10-CM) - Spinal stenosis of lumbar region, unspecified whether neurogenic claudication present       Michel Rodriguez has classic gluteal tendinopathy that is painful with walking and laying on L side. Both R and L are painful with L being worse. We will start a tendon loading program.    X = TO BE PERFORMED NEXT VISIT  > = PROGRESS TO THIS    S  : pt reports she is feeling about the same . O: Discussed anatomy, physiology, body mechanics, principles of loading, and progressive loading/activity. Access Code: [email or web address]  URL: [email or web address]  Date: 10/02/2023  Prepared by: Saúl Blackmon    Exercises  - Standing Row with Anchored Resistance  - 1 x daily - 7 x weekly - 3 sets - 15 reps  - Standing Bent Over Low Shoulder Row with Anchored Resistance  - 1 x daily - 7 x weekly - 3 sets - 15 reps    Access Code: [email or web address]  URL: [email or web address]  Date: 10/05/2023  Prepared by: Saúl Blackmon    Exercises  - Standing Quad Stretch in [address] [place] [address]  - 1 x daily - 3 x weekly - 3 sets - 8 reps    Time 4122-9224     Visit  7/  Repeat outcome measure at mid point and end.     Pain Pain with activity 3/10     ROM      Modalities         MO   Manual            Stretch      Towel / strap DF, INV, EV   TE      TE   Exercise      Hip ABD isometric 3 x 5 w/ 8 sec hold 4lb cuff weight  bilateral 2 min rest between sets TE   clamshell 2 x 15 bilateral        TE   Mid rows Blue 3 x 15   TE   Low rows Blue 3 x 15   TE   DB alt press x  TE   [] TG  [] Leg Press 2-leg   TE   [] TG  [] Leg Press 1-leg   TE   Hamstring Curl Machine   TE   Knee Extension Machine   TE

## 2023-10-09 ENCOUNTER — OFFICE VISIT (OUTPATIENT)
Dept: ORTHOPEDIC SURGERY | Age: 68
End: 2023-10-09
Payer: MEDICARE

## 2023-10-09 VITALS — HEIGHT: 63 IN | TEMPERATURE: 98 F | BODY MASS INDEX: 29.59 KG/M2 | WEIGHT: 167 LBS

## 2023-10-09 DIAGNOSIS — M20.011 MALLET DEFORMITY OF RIGHT MIDDLE FINGER: Primary | ICD-10-CM

## 2023-10-09 PROCEDURE — 99213 OFFICE O/P EST LOW 20 MIN: CPT | Performed by: ORTHOPAEDIC SURGERY

## 2023-10-09 PROCEDURE — G8417 CALC BMI ABV UP PARAM F/U: HCPCS | Performed by: ORTHOPAEDIC SURGERY

## 2023-10-09 PROCEDURE — G8399 PT W/DXA RESULTS DOCUMENT: HCPCS | Performed by: ORTHOPAEDIC SURGERY

## 2023-10-09 PROCEDURE — G8484 FLU IMMUNIZE NO ADMIN: HCPCS | Performed by: ORTHOPAEDIC SURGERY

## 2023-10-09 PROCEDURE — 1036F TOBACCO NON-USER: CPT | Performed by: ORTHOPAEDIC SURGERY

## 2023-10-09 PROCEDURE — 1123F ACP DISCUSS/DSCN MKR DOCD: CPT | Performed by: ORTHOPAEDIC SURGERY

## 2023-10-09 PROCEDURE — 3017F COLORECTAL CA SCREEN DOC REV: CPT | Performed by: ORTHOPAEDIC SURGERY

## 2023-10-09 PROCEDURE — 1090F PRES/ABSN URINE INCON ASSESS: CPT | Performed by: ORTHOPAEDIC SURGERY

## 2023-10-09 PROCEDURE — G8428 CUR MEDS NOT DOCUMENT: HCPCS | Performed by: ORTHOPAEDIC SURGERY

## 2023-10-09 NOTE — PROGRESS NOTES
Chief Complaint   Patient presents with    Finger Injury     Mallet Finger. Pt declined having any pain at the time of visit. Maddi Jc is a 76y.o. year old  female who presents for evaluation of right hand pain. she reports this started 9 days ago. she does remember a specific injury that started the pain. The injury was direct trauma. Her pain is located mainly in the long finger. The pain is worse with movement and better with rest and splint. The patient has tried  splint . The treatment has been effective. The patient is Right hand dominant.    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    Splenic artery aneurysm (HCC) 05/21/2013    Vertigo x1 after recent sinusitis    Vitamin D deficiency 12/03/2021     Past Surgical History:   Procedure Laterality Date    ATRIAL ABLATION SURGERY      BREAST BIOPSY Left     BREAST LUMPECTOMY Left     BREAST SURGERY      lumpectomy + lymph node dissection    EYE SURGERY Left     muscle surgery    HYSTERECTOMY (CERVIX STATUS UNKNOWN)         Current Outpatient Medications:     Amoxicillin-Pot Clavulanate (AUGMENTIN PO), Take by mouth, Disp: , Rfl:     lisinopril (PRINIVIL;ZESTRIL) 2.5 MG tablet, TAKE ONE TABLET BY MOUTH EVERY DAY, Disp: 90 tablet, Rfl: 3    Cholecalciferol 50 MCG (2000 UT) TABS, Take 1 tablet by mouth daily, Disp: , Rfl:     metoprolol succinate (TOPROL XL) 25 MG extended release tablet, TAKE ONE TABLET BY MOUTH EVERY DAY, Disp: 90 tablet, Rfl: 3    furosemide (LASIX) 20 MG tablet, Take 1 tablet by mouth daily, Disp: 90 tablet,

## 2023-10-31 DIAGNOSIS — Z12.31 SCREENING MAMMOGRAM FOR HIGH-RISK PATIENT: Primary | ICD-10-CM

## 2023-11-09 ENCOUNTER — OFFICE VISIT (OUTPATIENT)
Dept: ORTHOPEDIC SURGERY | Age: 68
End: 2023-11-09
Payer: MEDICARE

## 2023-11-09 VITALS — BODY MASS INDEX: 29.59 KG/M2 | TEMPERATURE: 98 F | HEIGHT: 63 IN | WEIGHT: 167 LBS

## 2023-11-09 DIAGNOSIS — M20.011 MALLET DEFORMITY OF RIGHT MIDDLE FINGER: Primary | ICD-10-CM

## 2023-11-09 PROCEDURE — 1036F TOBACCO NON-USER: CPT | Performed by: ORTHOPAEDIC SURGERY

## 2023-11-09 PROCEDURE — G8399 PT W/DXA RESULTS DOCUMENT: HCPCS | Performed by: ORTHOPAEDIC SURGERY

## 2023-11-09 PROCEDURE — 1123F ACP DISCUSS/DSCN MKR DOCD: CPT | Performed by: ORTHOPAEDIC SURGERY

## 2023-11-09 PROCEDURE — 1090F PRES/ABSN URINE INCON ASSESS: CPT | Performed by: ORTHOPAEDIC SURGERY

## 2023-11-09 PROCEDURE — G8427 DOCREV CUR MEDS BY ELIG CLIN: HCPCS | Performed by: ORTHOPAEDIC SURGERY

## 2023-11-09 PROCEDURE — G8417 CALC BMI ABV UP PARAM F/U: HCPCS | Performed by: ORTHOPAEDIC SURGERY

## 2023-11-09 PROCEDURE — 99213 OFFICE O/P EST LOW 20 MIN: CPT | Performed by: ORTHOPAEDIC SURGERY

## 2023-11-09 PROCEDURE — G8484 FLU IMMUNIZE NO ADMIN: HCPCS | Performed by: ORTHOPAEDIC SURGERY

## 2023-11-09 PROCEDURE — 3017F COLORECTAL CA SCREEN DOC REV: CPT | Performed by: ORTHOPAEDIC SURGERY

## 2023-11-09 NOTE — PROGRESS NOTES
Chief Complaint   Patient presents with    Hand Injury     F/u right hand long mallet finger deformity. Patient states she has been complaint with the splint and her pain has been minimal.       Lianne Lynch is a 76y.o. year old  female who presents for follow up right mallet finger. She isn't having pain. Patient has been wearing Stax splint consistently.   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    Splenic artery aneurysm (HCC) 05/21/2013    Vertigo x1 after recent sinusitis    Vitamin D deficiency 12/03/2021     Past Surgical History:   Procedure Laterality Date    ATRIAL ABLATION SURGERY      BREAST BIOPSY Left     BREAST LUMPECTOMY Left     BREAST SURGERY      lumpectomy + lymph node dissection    EYE SURGERY Left     muscle surgery    HYSTERECTOMY (CERVIX STATUS UNKNOWN)         Current Outpatient Medications:     Amoxicillin-Pot Clavulanate (AUGMENTIN PO), Take by mouth, Disp: , Rfl:     lisinopril (PRINIVIL;ZESTRIL) 2.5 MG tablet, TAKE ONE TABLET BY MOUTH EVERY DAY, Disp: 90 tablet, Rfl: 3    Cholecalciferol 50 MCG (2000 UT) TABS, Take 1 tablet by mouth daily, Disp: , Rfl:     metoprolol succinate (TOPROL XL) 25 MG extended release tablet, TAKE ONE TABLET BY MOUTH EVERY DAY, Disp: 90 tablet, Rfl: 3    furosemide (LASIX) 20 MG tablet, Take 1 tablet by mouth daily, Disp: 90 tablet, Rfl: 1    aspirin-acetaminophen-caffeine (EXCEDRIN MIGRAINE) 250-250-65 MG per tablet, Take 1 tablet by mouth every 6 hours as needed for Headaches Patient takes as needed for joint pain and headaches., Disp: ,

## 2023-11-16 RX ORDER — FUROSEMIDE 20 MG/1
20 TABLET ORAL DAILY
Qty: 90 TABLET | Refills: 1 | Status: SHIPPED | OUTPATIENT
Start: 2023-11-16

## 2023-12-04 ENCOUNTER — TELEPHONE (OUTPATIENT)
Dept: PRIMARY CARE CLINIC | Age: 68
End: 2023-12-04

## 2023-12-04 NOTE — TELEPHONE ENCOUNTER
Pt has an Annual Wellness Visit on 1/11/24 and would like to have labs done prior to appt     Asked if orders can be placed. Please advise.

## 2023-12-05 ENCOUNTER — OFFICE VISIT (OUTPATIENT)
Dept: ORTHOPEDIC SURGERY | Age: 68
End: 2023-12-05
Payer: MEDICARE

## 2023-12-05 VITALS — TEMPERATURE: 98 F | BODY MASS INDEX: 29.59 KG/M2 | WEIGHT: 167 LBS | HEIGHT: 63 IN

## 2023-12-05 DIAGNOSIS — Z00.00 MEDICARE ANNUAL WELLNESS VISIT, SUBSEQUENT: Primary | ICD-10-CM

## 2023-12-05 DIAGNOSIS — M20.011 MALLET DEFORMITY OF RIGHT MIDDLE FINGER: Primary | ICD-10-CM

## 2023-12-05 DIAGNOSIS — E55.9 VITAMIN D INSUFFICIENCY: ICD-10-CM

## 2023-12-05 DIAGNOSIS — E78.5 HYPERLIPIDEMIA, UNSPECIFIED HYPERLIPIDEMIA TYPE: ICD-10-CM

## 2023-12-05 PROCEDURE — G8399 PT W/DXA RESULTS DOCUMENT: HCPCS | Performed by: ORTHOPAEDIC SURGERY

## 2023-12-05 PROCEDURE — 1090F PRES/ABSN URINE INCON ASSESS: CPT | Performed by: ORTHOPAEDIC SURGERY

## 2023-12-05 PROCEDURE — 1036F TOBACCO NON-USER: CPT | Performed by: ORTHOPAEDIC SURGERY

## 2023-12-05 PROCEDURE — G8484 FLU IMMUNIZE NO ADMIN: HCPCS | Performed by: ORTHOPAEDIC SURGERY

## 2023-12-05 PROCEDURE — G8427 DOCREV CUR MEDS BY ELIG CLIN: HCPCS | Performed by: ORTHOPAEDIC SURGERY

## 2023-12-05 PROCEDURE — 3017F COLORECTAL CA SCREEN DOC REV: CPT | Performed by: ORTHOPAEDIC SURGERY

## 2023-12-05 PROCEDURE — 1123F ACP DISCUSS/DSCN MKR DOCD: CPT | Performed by: ORTHOPAEDIC SURGERY

## 2023-12-05 PROCEDURE — G8417 CALC BMI ABV UP PARAM F/U: HCPCS | Performed by: ORTHOPAEDIC SURGERY

## 2023-12-05 PROCEDURE — 99213 OFFICE O/P EST LOW 20 MIN: CPT | Performed by: ORTHOPAEDIC SURGERY

## 2023-12-05 NOTE — TELEPHONE ENCOUNTER
PC to patient to inform labs were ordered    Patient to go to [facility] facility closer to [facility] appt date in January

## 2023-12-05 NOTE — PROGRESS NOTES
Chief Complaint   Patient presents with    Follow-up     Pt has nothing to complain at the time of visit. Pt expressed doing well. Pt is wearing finger splint. Jonatan Corrales is a 76y.o. year old  female who presents for follow up right mallet finger. She is doing well.     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    Splenic artery aneurysm (HCC) 05/21/2013    Vertigo x1 after recent sinusitis    Vitamin D deficiency 12/03/2021     Past Surgical History:   Procedure Laterality Date    ATRIAL ABLATION SURGERY      BREAST BIOPSY Left     BREAST LUMPECTOMY Left     BREAST SURGERY      lumpectomy + lymph node dissection    EYE SURGERY Left     muscle surgery    HYSTERECTOMY (CERVIX STATUS UNKNOWN)         Current Outpatient Medications:     furosemide (LASIX) 20 MG tablet, Take 1 tablet by mouth daily, Disp: 90 tablet, Rfl: 1    lisinopril (PRINIVIL;ZESTRIL) 2.5 MG tablet, TAKE ONE TABLET BY MOUTH EVERY DAY, Disp: 90 tablet, Rfl: 3    Cholecalciferol 50 MCG (2000 UT) TABS, Take 1 tablet by mouth daily, Disp: , Rfl:     metoprolol succinate (TOPROL XL) 25 MG extended release tablet, TAKE ONE TABLET BY MOUTH EVERY DAY, Disp: 90 tablet, Rfl: 3    aspirin-acetaminophen-caffeine (EXCEDRIN MIGRAINE) 250-250-65 MG per tablet, Take 1 tablet by mouth every 6 hours as needed for Headaches Patient takes as needed for joint pain and headaches., Disp: , Rfl:     Selenium 200 MCG CAPS, Take 200 mg by mouth 2 times daily, Disp: , Rfl:     calcium carbonate (OSCAL) 500 MG TABS tablet, Take 1 tablet by mouth

## 2023-12-06 RX ORDER — METOPROLOL SUCCINATE 25 MG/1
TABLET, EXTENDED RELEASE ORAL
Qty: 90 TABLET | Refills: 3 | Status: SHIPPED | OUTPATIENT
Start: 2023-12-06

## 2024-01-08 SDOH — ECONOMIC STABILITY: INCOME INSECURITY: HOW HARD IS IT FOR YOU TO PAY FOR THE VERY BASICS LIKE FOOD, HOUSING, MEDICAL CARE, AND HEATING?: NOT HARD AT ALL

## 2024-01-08 SDOH — ECONOMIC STABILITY: FOOD INSECURITY: WITHIN THE PAST 12 MONTHS, YOU WORRIED THAT YOUR FOOD WOULD RUN OUT BEFORE YOU GOT MONEY TO BUY MORE.: NEVER TRUE

## 2024-01-08 SDOH — HEALTH STABILITY: PHYSICAL HEALTH: ON AVERAGE, HOW MANY DAYS PER WEEK DO YOU ENGAGE IN MODERATE TO STRENUOUS EXERCISE (LIKE A BRISK WALK)?: 2 DAYS

## 2024-01-08 SDOH — ECONOMIC STABILITY: TRANSPORTATION INSECURITY
IN THE PAST 12 MONTHS, HAS LACK OF TRANSPORTATION KEPT YOU FROM MEETINGS, WORK, OR FROM GETTING THINGS NEEDED FOR DAILY LIVING?: NO

## 2024-01-08 SDOH — ECONOMIC STABILITY: HOUSING INSECURITY
IN THE LAST 12 MONTHS, WAS THERE A TIME WHEN YOU DID NOT HAVE A STEADY PLACE TO SLEEP OR SLEPT IN A SHELTER (INCLUDING NOW)?: NO

## 2024-01-08 SDOH — ECONOMIC STABILITY: FOOD INSECURITY: WITHIN THE PAST 12 MONTHS, THE FOOD YOU BOUGHT JUST DIDN'T LAST AND YOU DIDN'T HAVE MONEY TO GET MORE.: NEVER TRUE

## 2024-01-08 ASSESSMENT — PATIENT HEALTH QUESTIONNAIRE - PHQ9
SUM OF ALL RESPONSES TO PHQ QUESTIONS 1-9: 0
SUM OF ALL RESPONSES TO PHQ QUESTIONS 1-9: 0
SUM OF ALL RESPONSES TO PHQ9 QUESTIONS 1 & 2: 0
SUM OF ALL RESPONSES TO PHQ QUESTIONS 1-9: 0
SUM OF ALL RESPONSES TO PHQ QUESTIONS 1-9: 0
2. FEELING DOWN, DEPRESSED OR HOPELESS: 0
1. LITTLE INTEREST OR PLEASURE IN DOING THINGS: 0

## 2024-01-08 ASSESSMENT — LIFESTYLE VARIABLES
HOW OFTEN DO YOU HAVE A DRINK CONTAINING ALCOHOL: 2
HOW MANY STANDARD DRINKS CONTAINING ALCOHOL DO YOU HAVE ON A TYPICAL DAY: 1 OR 2
HOW OFTEN DO YOU HAVE SIX OR MORE DRINKS ON ONE OCCASION: 1
HOW OFTEN DO YOU HAVE A DRINK CONTAINING ALCOHOL: MONTHLY OR LESS
HOW MANY STANDARD DRINKS CONTAINING ALCOHOL DO YOU HAVE ON A TYPICAL DAY: 1

## 2024-01-09 ENCOUNTER — HOSPITAL ENCOUNTER (OUTPATIENT)
Age: 69
Discharge: HOME | End: 2024-01-09
Payer: MEDICARE

## 2024-01-09 ENCOUNTER — HOSPITAL ENCOUNTER (OUTPATIENT)
Dept: GENERAL RADIOLOGY | Age: 69
Discharge: HOME | End: 2024-01-11
Payer: MEDICARE

## 2024-01-09 VITALS — HEIGHT: 63 IN | BODY MASS INDEX: 29.77 KG/M2 | WEIGHT: 168 LBS

## 2024-01-09 DIAGNOSIS — E78.5 HYPERLIPIDEMIA, UNSPECIFIED HYPERLIPIDEMIA TYPE: ICD-10-CM

## 2024-01-09 DIAGNOSIS — Z00.00 MEDICARE ANNUAL WELLNESS VISIT, SUBSEQUENT: ICD-10-CM

## 2024-01-09 DIAGNOSIS — Z12.31 SCREENING MAMMOGRAM FOR HIGH-RISK PATIENT: ICD-10-CM

## 2024-01-09 DIAGNOSIS — E55.9 VITAMIN D INSUFFICIENCY: ICD-10-CM

## 2024-01-09 LAB
25(OH)D3 SERPL-MCNC: 45.3 NG/ML (ref 30–100)
ALBUMIN SERPL-MCNC: 4.1 G/DL (ref 3.5–5.2)
ALP SERPL-CCNC: 61 U/L (ref 35–104)
ALT SERPL-CCNC: 13 U/L (ref 0–32)
ANION GAP SERPL CALCULATED.3IONS-SCNC: 10 MMOL/L (ref 7–16)
AST SERPL-CCNC: 20 U/L (ref 0–31)
BILIRUB SERPL-MCNC: 0.4 MG/DL (ref 0–1.2)
BUN SERPL-MCNC: 24 MG/DL (ref 6–23)
CALCIUM SERPL-MCNC: 9.1 MG/DL (ref 8.6–10.2)
CHLORIDE SERPL-SCNC: 107 MMOL/L (ref 98–107)
CHOLEST SERPL-MCNC: 273 MG/DL
CO2 SERPL-SCNC: 24 MMOL/L (ref 22–29)
CREAT SERPL-MCNC: 1 MG/DL (ref 0.5–1)
ERYTHROCYTE [DISTWIDTH] IN BLOOD BY AUTOMATED COUNT: 12 % (ref 11.5–15)
GFR SERPL CREATININE-BSD FRML MDRD: >60 ML/MIN/1.73M2
GLUCOSE P FAST SERPL-MCNC: 92 MG/DL (ref 74–99)
HCT VFR BLD AUTO: 41.3 % (ref 34–48)
HDLC SERPL-MCNC: 73 MG/DL
HGB BLD-MCNC: 13.8 G/DL (ref 11.5–15.5)
LDLC SERPL CALC-MCNC: 187 MG/DL
MCH RBC QN AUTO: 30.6 PG (ref 26–35)
MCHC RBC AUTO-ENTMCNC: 33.4 G/DL (ref 32–34.5)
MCV RBC AUTO: 91.6 FL (ref 80–99.9)
PLATELET # BLD AUTO: 228 K/UL (ref 130–450)
PMV BLD AUTO: 10.5 FL (ref 7–12)
POTASSIUM SERPL-SCNC: 4.9 MMOL/L (ref 3.5–5)
PROT SERPL-MCNC: 6.7 G/DL (ref 6.4–8.3)
RBC # BLD AUTO: 4.51 M/UL (ref 3.5–5.5)
SODIUM SERPL-SCNC: 141 MMOL/L (ref 132–146)
TRIGL SERPL-MCNC: 63 MG/DL
VLDLC SERPL CALC-MCNC: 13 MG/DL
WBC OTHER # BLD: 5.9 K/UL (ref 4.5–11.5)

## 2024-01-09 PROCEDURE — 36415 COLL VENOUS BLD VENIPUNCTURE: CPT

## 2024-01-09 PROCEDURE — 80053 COMPREHEN METABOLIC PANEL: CPT

## 2024-01-09 PROCEDURE — 85027 COMPLETE CBC AUTOMATED: CPT

## 2024-01-09 PROCEDURE — 82306 VITAMIN D 25 HYDROXY: CPT

## 2024-01-09 PROCEDURE — 77063 BREAST TOMOSYNTHESIS BI: CPT

## 2024-01-09 PROCEDURE — 80061 LIPID PANEL: CPT

## 2024-01-11 ENCOUNTER — OFFICE VISIT (OUTPATIENT)
Dept: PRIMARY CARE CLINIC | Age: 69
End: 2024-01-11

## 2024-01-11 VITALS
DIASTOLIC BLOOD PRESSURE: 84 MMHG | TEMPERATURE: 98.4 F | OXYGEN SATURATION: 98 % | SYSTOLIC BLOOD PRESSURE: 118 MMHG | HEART RATE: 81 BPM | HEIGHT: 63 IN | BODY MASS INDEX: 30.3 KG/M2 | WEIGHT: 171 LBS

## 2024-01-11 DIAGNOSIS — I48.0 PAF (PAROXYSMAL ATRIAL FIBRILLATION) (HCC): ICD-10-CM

## 2024-01-11 DIAGNOSIS — Z00.00 MEDICARE ANNUAL WELLNESS VISIT, SUBSEQUENT: Primary | ICD-10-CM

## 2024-01-11 DIAGNOSIS — Z17.0 STAGE 1 BREAST CANCER, ER+, LEFT (HCC): ICD-10-CM

## 2024-01-11 DIAGNOSIS — C50.912 STAGE 1 BREAST CANCER, ER+, LEFT (HCC): ICD-10-CM

## 2024-01-11 DIAGNOSIS — I72.8 SPLENIC ARTERY ANEURYSM (HCC): ICD-10-CM

## 2024-01-11 DIAGNOSIS — Z11.59 NEED FOR HEPATITIS C SCREENING TEST: ICD-10-CM

## 2024-01-11 DIAGNOSIS — E78.5 HYPERLIPIDEMIA, UNSPECIFIED HYPERLIPIDEMIA TYPE: ICD-10-CM

## 2024-01-11 PROBLEM — E66.9 OBESITY, CLASS I, BMI 30-34.9: Status: ACTIVE | Noted: 2023-11-07

## 2024-01-11 PROBLEM — E66.811 OBESITY, CLASS I, BMI 30-34.9: Status: ACTIVE | Noted: 2023-11-07

## 2024-01-11 NOTE — PATIENT INSTRUCTIONS
want to donate your organs when you die?  Do you want certain [employer] practices performed before you die?  When should you call for help?  Be sure to contact your doctor if you have any questions.  Where can you learn more?  Go to [email or web address] and enter R264 to learn more about \"Advance Directives: Care Instructions.\"  Current as of: March 26, 2023               Content Version: 13.9  © 6625-9830 [employer].   Care instructions adapted under license by [employer]. If you have questions about a medical condition or this instruction, always ask your healthcare professional. [employer] disclaims any warranty or liability for your use of this information.           Starting a Weight Loss Plan: Care Instructions  Overview     If you're thinking about losing weight, it can be hard to know where to start. Your doctor can help you set up a weight loss plan that best meets your needs. You may want to take a class on nutrition or exercise, or you could join a weight loss support group. If you have questions about how to make changes to your eating or exercise habits, ask your doctor about seeing a registered dietitian or an exercise specialist.  It can be a big challenge to lose weight. But you don't have to make huge changes at once. Make small changes, and stick with them. When those changes become habit, add a few more changes.  If you don't think you're ready to make changes right now, try to pick a date in the future. Make an appointment to see your doctor to discuss whether the time is right for you to start a plan.  Follow-up care is a key part of your treatment and safety. Be sure to make and go to all appointments, and call your doctor if you are having problems. It's also a good idea to know your test results and keep a list of the medicines you take.  How can you care for yourself at home?  Set realistic goals. Many people expect to lose much more weight than

## 2024-01-11 NOTE — PROGRESS NOTES
rythmol   Almost passed out rapid heart beat    Krill Oil Swelling    Other      TAMACOR    Simvastatin Other (See Comments)     achey    Tambocor [Flecainide Acetate]      Side affect numbness tingling hands and feet     Prior to Visit Medications    Medication Sig Taking? Authorizing Provider   metoprolol succinate (TOPROL XL) 25 MG extended release tablet TAKE ONE TABLET BY MOUTH EVERY DAY Yes Jayden Sotelo, DO   furosemide (LASIX) 20 MG tablet Take 1 tablet by mouth daily Yes Jayden Sotelo DO   lisinopril (PRINIVIL;ZESTRIL) 2.5 MG tablet TAKE ONE TABLET BY MOUTH EVERY DAY Yes Jayden Sotelo, DO   Cholecalciferol 50 MCG (2000 UT) TABS Take 1 tablet by mouth daily Yes Jose Rucker MD   aspirin-acetaminophen-caffeine (EXCEDRIN MIGRAINE) 250-250-65 MG per tablet Take 1 tablet by mouth every 6 hours as needed for Headaches Patient takes as needed for joint pain and headaches. Yes Jose Rucker MD   Selenium 200 MCG CAPS Take 200 mg by mouth 2 times daily Yes Jose Rucker MD   calcium carbonate (OSCAL) 500 MG TABS tablet Take 1 tablet by mouth daily Yes Jose Rucker MD   Amoxicillin-Pot Clavulanate (AUGMENTIN PO) Take by mouth  Jose Rucker MD     Physical Exam:  General Appearance: alert and oriented to person, place and time, well-developed and well-nourished, in no acute distress  Skin: warm and dry, no rash or erythema  Head: normocephalic and atraumatic and hair loss consistent with alopecia areata, currently wearing a wig.  Eyes: pupils equal, round, and reactive to light, extraocular eye movements intact, conjunctivae normal  ENT: tympanic membrane, external ear and ear canal normal bilaterally, oropharynx clear and moist with normal mucous membranes  Neck: neck supple and non tender without mass, no thyromegaly or thyroid nodules, no cervical lymphadenopathy   Pulmonary/Chest: clear to auscultation bilaterally- no wheezes, rales or rhonchi, normal air movement,

## 2024-03-27 NOTE — ED PROVIDER NOTES
Department of Emergency Medicine   [facility] [address] [place] [address]  Provider Note  Admit Date/Time: 2023 12:19 PM  Room:   NAME: Suellen Griggs  : 1955  MRN: [id number]     Chief Complaint:  Hand Injury (Right hand injury middle finger, jammed it about 1 hr PTA)    History of Present Illness        Suellen Griggs is a 76 y.o. female who has a past medical history of:   Past Medical History:   Diagnosis Date    Alopecia areata 2021    Aneurysm of splenic artery (HCC)     Atrial fibrillation ([address])     BRCA1 negative     BRCA2 negative     Breast cancer ([address])     Cancer ([address])     left breast    Graves' eye disease     left eye    History of tachycardia     History of therapeutic radiation     Hx antineoplastic chemo     Hyperlipidemia     Hypertension     Other congenital anomalies of pulmonary valve 2011    Splenic artery aneurysm (HCC) 2013    Vertigo x1 after recent sinusitis    Vitamin D deficiency 2021    presents to the urgent care center by private car for right third finger. She was cleaning and the wall when she said that she banged her finger against the wall and now the tip of the finger is in a flexed position and it will not straighten unless she manually uses the other hand to straighten it. This happened just prior to arrival she has no other injuries or complaints. ROS    Pertinent positives and negatives are stated within HPI, all other systems reviewed and are negative. Past Surgical History:   Procedure Laterality Date    ATRIAL ABLATION SURGERY      BREAST BIOPSY Left     BREAST LUMPECTOMY Left     BREAST SURGERY      lumpectomy + lymph node dissection    EYE SURGERY Left     muscle surgery    HYSTERECTOMY (CERVIX STATUS UNKNOWN)     Social History:  reports that she has never smoked. She has never used smokeless tobacco. She reports that she does not drink alcohol and does not use drugs.   Family History: family history includes Pt came in for diarrhea, CT showed colitis

## 2024-05-03 ENCOUNTER — HOSPITAL ENCOUNTER (OUTPATIENT)
Age: 69
Discharge: HOME | End: 2024-05-03
Payer: MEDICARE

## 2024-05-03 DIAGNOSIS — E78.5 HYPERLIPIDEMIA, UNSPECIFIED HYPERLIPIDEMIA TYPE: ICD-10-CM

## 2024-05-03 DIAGNOSIS — Z11.59 NEED FOR HEPATITIS C SCREENING TEST: ICD-10-CM

## 2024-05-03 LAB
CHOLEST SERPL-MCNC: 232 MG/DL
HCV AB SERPL QL IA: NONREACTIVE
HDLC SERPL-MCNC: 63 MG/DL
LDLC SERPL CALC-MCNC: 154 MG/DL
TRIGL SERPL-MCNC: 77 MG/DL
VLDLC SERPL CALC-MCNC: 15 MG/DL

## 2024-05-03 PROCEDURE — 36415 COLL VENOUS BLD VENIPUNCTURE: CPT

## 2024-05-03 PROCEDURE — 80061 LIPID PANEL: CPT

## 2024-05-03 PROCEDURE — 86803 HEPATITIS C AB TEST: CPT

## 2024-05-10 RX ORDER — LISINOPRIL 2.5 MG/1
TABLET ORAL
Qty: 90 TABLET | Refills: 3 | Status: SHIPPED | OUTPATIENT
Start: 2024-05-10

## 2024-05-10 NOTE — TELEPHONE ENCOUNTER
Requested Prescriptions     Pending Prescriptions Disp Refills    lisinopril (PRINIVIL;ZESTRIL) 2.5 MG tablet [Pharmacy Med Name: Lisinopril Oral Tablet 2.5 MG] 90 tablet 0     Sig: TAKE ONE TABLET BY MOUTH EVERY DAY       Next appt is 1/13/2025  Last appt was 1/11/2024

## 2024-05-22 RX ORDER — FUROSEMIDE 20 MG/1
20 TABLET ORAL DAILY
Qty: 90 TABLET | Refills: 0 | Status: SHIPPED | OUTPATIENT
Start: 2024-05-22

## 2024-06-11 ENCOUNTER — OFFICE VISIT (OUTPATIENT)
Dept: PRIMARY CARE CLINIC | Age: 69
End: 2024-06-11
Payer: MEDICARE

## 2024-06-11 VITALS
OXYGEN SATURATION: 99 % | HEART RATE: 64 BPM | HEIGHT: 69 IN | TEMPERATURE: 98.2 F | DIASTOLIC BLOOD PRESSURE: 80 MMHG | SYSTOLIC BLOOD PRESSURE: 118 MMHG | BODY MASS INDEX: 25.03 KG/M2 | WEIGHT: 169 LBS

## 2024-06-11 DIAGNOSIS — I07.1 MODERATE TRICUSPID REGURGITATION: ICD-10-CM

## 2024-06-11 DIAGNOSIS — I10 ESSENTIAL HYPERTENSION: ICD-10-CM

## 2024-06-11 DIAGNOSIS — I27.20 PULMONARY HYPERTENSION (HCC): Primary | ICD-10-CM

## 2024-06-11 DIAGNOSIS — N64.89 POSTOPERATIVE BREAST ASYMMETRY: ICD-10-CM

## 2024-06-11 DIAGNOSIS — C50.912 STAGE 1 BREAST CANCER, ER+, LEFT (HCC): ICD-10-CM

## 2024-06-11 DIAGNOSIS — Z17.0 STAGE 1 BREAST CANCER, ER+, LEFT (HCC): ICD-10-CM

## 2024-06-11 DIAGNOSIS — I72.8 SPLENIC ARTERY ANEURYSM (HCC): ICD-10-CM

## 2024-06-11 DIAGNOSIS — I35.1 NONRHEUMATIC AORTIC VALVE INSUFFICIENCY: ICD-10-CM

## 2024-06-11 PROCEDURE — 1090F PRES/ABSN URINE INCON ASSESS: CPT | Performed by: FAMILY MEDICINE

## 2024-06-11 PROCEDURE — 99213 OFFICE O/P EST LOW 20 MIN: CPT | Performed by: FAMILY MEDICINE

## 2024-06-11 PROCEDURE — 1123F ACP DISCUSS/DSCN MKR DOCD: CPT | Performed by: FAMILY MEDICINE

## 2024-06-11 PROCEDURE — 3079F DIAST BP 80-89 MM HG: CPT | Performed by: FAMILY MEDICINE

## 2024-06-11 PROCEDURE — 3074F SYST BP LT 130 MM HG: CPT | Performed by: FAMILY MEDICINE

## 2024-06-11 PROCEDURE — 3017F COLORECTAL CA SCREEN DOC REV: CPT | Performed by: FAMILY MEDICINE

## 2024-06-11 PROCEDURE — G8420 CALC BMI NORM PARAMETERS: HCPCS | Performed by: FAMILY MEDICINE

## 2024-06-11 PROCEDURE — 1036F TOBACCO NON-USER: CPT | Performed by: FAMILY MEDICINE

## 2024-06-11 PROCEDURE — G8399 PT W/DXA RESULTS DOCUMENT: HCPCS | Performed by: FAMILY MEDICINE

## 2024-06-11 PROCEDURE — G8427 DOCREV CUR MEDS BY ELIG CLIN: HCPCS | Performed by: FAMILY MEDICINE

## 2024-06-11 RX ORDER — MULTIVITAMIN WITH IRON
100 TABLET ORAL DAILY
COMMUNITY

## 2024-06-17 PROBLEM — I07.1 MODERATE TRICUSPID REGURGITATION: Status: ACTIVE | Noted: 2024-06-17

## 2024-06-17 PROBLEM — I27.20 PULMONARY HYPERTENSION (HCC): Status: ACTIVE | Noted: 2024-05-12

## 2024-06-17 ASSESSMENT — ENCOUNTER SYMPTOMS
WHEEZING: 0
COUGH: 0
SINUS PRESSURE: 0
DIARRHEA: 0
EYE DISCHARGE: 0
ABDOMINAL PAIN: 0
EYE PAIN: 0
PHOTOPHOBIA: 0
BLOOD IN STOOL: 0
RHINORRHEA: 0
SORE THROAT: 0
EYE ITCHING: 0
APNEA: 1
FACIAL SWELLING: 0
CONSTIPATION: 0
ABDOMINAL DISTENTION: 0
COLOR CHANGE: 0
NAUSEA: 0
BACK PAIN: 1
VOMITING: 0
SHORTNESS OF BREATH: 0

## 2024-06-17 NOTE — PROGRESS NOTES
Status   01/09/2024 24 22 - 29 mmol/L Final     Anion Gap   Date Value Ref Range Status   01/09/2024 10 7 - 16 mmol/L Final     Glucose   Date Value Ref Range Status   08/02/2023 94 74 - 99 mg/dL Final   04/09/2012 109 70 - 110 mg/dL Final     BUN   Date Value Ref Range Status   01/09/2024 24 (H) 6 - 23 mg/dL Final     Creatinine   Date Value Ref Range Status   01/09/2024 1.0 0.50 - 1.00 mg/dL Final     Est, Glom Filt Rate   Date Value Ref Range Status   01/09/2024 >60 >60 mL/min/1.73m2 Final     Comment:           These results are not intended for use in patients <18 years of age.        eGFR results are calculated without a race factor using the 2021 CKD-EPI equation.  Careful clinical correlation is recommended, particularly when comparing to results   calculated using previous equations.  The CKD-EPI equation is less accurate in patients with extremes of muscle mass, extra-renal   metabolism of creatine, excessive creatine ingestion, or following therapy that affects   renal tubular secretion.       GFR    Date Value Ref Range Status   07/29/2022 >60  Final     Calcium   Date Value Ref Range Status   01/09/2024 9.1 8.6 - 10.2 mg/dL Final     Total Bilirubin   Date Value Ref Range Status   01/09/2024 0.4 0.0 - 1.2 mg/dL Final     Alkaline Phosphatase   Date Value Ref Range Status   01/09/2024 61 35 - 104 U/L Final     ALT   Date Value Ref Range Status   01/09/2024 13 0 - 32 U/L Final     AST   Date Value Ref Range Status   01/09/2024 20 0 - 31 U/L Final       TSH  Lab Results   Component Value Date    TSH 1.550 12/07/2022       A1C  Lab Results   Component Value Date    LABA1C 5.1 11/23/2019       LIPID  Lab Results   Component Value Date    CHOL 232 (H) 05/03/2024    TRIG 77 05/03/2024    HDL 63 05/03/2024    VLDL 15 05/03/2024      No results found for this visit on 06/11/24.    An electronic signature was used to authenticate this note.    --Jayden Sotelo DO

## 2024-07-17 ENCOUNTER — OFFICE VISIT (OUTPATIENT)
Dept: SURGERY | Age: 69
End: 2024-07-17

## 2024-07-17 VITALS
SYSTOLIC BLOOD PRESSURE: 141 MMHG | HEART RATE: 57 BPM | TEMPERATURE: 97.2 F | WEIGHT: 182 LBS | HEIGHT: 63 IN | OXYGEN SATURATION: 95 % | BODY MASS INDEX: 32.25 KG/M2 | DIASTOLIC BLOOD PRESSURE: 68 MMHG

## 2024-07-17 DIAGNOSIS — Z15.09 BRCA GENE MUTATION POSITIVE: Primary | ICD-10-CM

## 2024-07-17 DIAGNOSIS — Z15.01 BRCA GENE MUTATION POSITIVE: Primary | ICD-10-CM

## 2024-07-17 NOTE — PROGRESS NOTES
Department of Plastic Surgery - Adult  Attending Consult Note          CHIEF COMPLAINT:  History of left breast cancer    History Obtained From:  patient    HISTORY OF PRESENT ILLNESS:                The patient is a 68 y.o. female who presents with History of left breast cancer.  Patient states that she had a lumpectomy and radiation therapy to the left breast about 15 years prior.  She states that she has never had any matching or reconstructive procedures to the right breast.  She presents today with complaints of right breast asymmetry as well and has desire for a right breast reduction.  She states she continues to follow with her cardiologist at this time is planned to see them again in September 2024.  She did have a mammogram this year which was BI-RADS Category 2.    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    Splenic artery aneurysm (HCC) 05/21/2013    Vertigo x1 after recent sinusitis    Vitamin D deficiency 12/03/2021     Past Surgical History:    Past Surgical History:   Procedure Laterality Date    ATRIAL ABLATION SURGERY      BREAST BIOPSY Left     BREAST LUMPECTOMY Left     BREAST SURGERY      lumpectomy + lymph node dissection    EYE SURGERY Left     muscle surgery    HYSTERECTOMY (CERVIX STATUS UNKNOWN)       Current Medications:      Current Outpatient Medications   Medication Sig Dispense Refill    vitamin B-6 (PYRIDOXINE) 100 MG tablet Take 1 tablet by mouth daily      furosemide (LASIX) 20 MG tablet TAKE ONE TABLET BY

## 2024-07-18 ENCOUNTER — TELEPHONE (OUTPATIENT)
Dept: SURGERY | Age: 69
End: 2024-07-18

## 2024-07-18 NOTE — TELEPHONE ENCOUNTER
Surgery has been scheduled at [facility] [address], [place] on 10/01/24, Pre-Admission Testing will call you prior to surgery to inform you arrival time and any other additional directions,if they are unable to reach you,please call them two days prior at [phone].  If taking Fish Oil, Vitamins, two weeks prior to surgery stop taking. If taking NSAIDS (such as Aspirin, Ibuprofen) anticoagulants please consult with your prescribing physician to get further instructions on when to stop medication prior to surgery that is scheduled, patient understood.    Pre-Auth #:  CPT Codes:   ICD 10:      Phone discussion was had with the patient today regarding upcoming surgery.  I had a discussion with the patient that although the patient's insurance company has approved the procedure proposed, there is no guarantee of payment by the insurance company on their final review following the procedure. This is also reflected in the letter received by this office and the patient from the insurance company.    The patient has voiced to me complete understanding of this scenario, understands that they will be responsible for their individual deductable/coinsurance balance as an out-of-pocket expense and possibly the financial responsibility of the surgical procedure if the patient's insurance company elects not to pay for certain or all services.    The patient elects to proceed with the proposed surgical plan.

## 2024-08-01 ENCOUNTER — TELEPHONE (OUTPATIENT)
Dept: VASCULAR SURGERY | Age: 69
End: 2024-08-01

## 2024-08-01 DIAGNOSIS — I72.8 SPLENIC ARTERY ANEURYSM (HCC): Primary | ICD-10-CM

## 2024-08-01 NOTE — TELEPHONE ENCOUNTER
Scheduled CT abdomen w/o contrast at [facility] 8/22/24 at 1:30 pm, arrival time of 1:00 pm, message left on her home answering machine.   Principal Discharge DX:	Hyperreflexia Principal Discharge DX:	Hyperreflexia  Instructions for follow-up, activity and diet:	Follow up with a Primary Care Provider/neurologist - list provided within 1 week of discharge for further evaluation - bring a copy of your lab work with you to your appointment. Return to the Emergency Department for any new, worsening or concerning symptoms.

## 2024-08-05 ENCOUNTER — OFFICE VISIT (OUTPATIENT)
Dept: ONCOLOGY | Age: 69
End: 2024-08-05
Payer: MEDICARE

## 2024-08-05 ENCOUNTER — HOSPITAL ENCOUNTER (OUTPATIENT)
Dept: INFUSION THERAPY | Age: 69
Discharge: HOME | End: 2024-08-05
Payer: MEDICARE

## 2024-08-05 VITALS
BODY MASS INDEX: 30.32 KG/M2 | OXYGEN SATURATION: 100 % | DIASTOLIC BLOOD PRESSURE: 63 MMHG | HEART RATE: 55 BPM | TEMPERATURE: 97.6 F | WEIGHT: 171.1 LBS | SYSTOLIC BLOOD PRESSURE: 165 MMHG | HEIGHT: 63 IN

## 2024-08-05 DIAGNOSIS — Z17.0 STAGE 1 BREAST CANCER, ER+, LEFT (HCC): Primary | ICD-10-CM

## 2024-08-05 DIAGNOSIS — C50.912 STAGE 1 BREAST CANCER, ER+, LEFT (HCC): Primary | ICD-10-CM

## 2024-08-05 LAB
ALBUMIN SERPL-MCNC: 3.8 G/DL (ref 3.5–5.2)
ALP SERPL-CCNC: 68 U/L (ref 35–104)
ALT SERPL-CCNC: 16 U/L (ref 0–32)
ANION GAP SERPL CALCULATED.3IONS-SCNC: 7 MMOL/L (ref 7–16)
AST SERPL-CCNC: 22 U/L (ref 0–31)
BASOPHILS # BLD: 0.06 K/UL (ref 0–0.2)
BASOPHILS NFR BLD: 1 % (ref 0–2)
BILIRUB SERPL-MCNC: 0.6 MG/DL (ref 0–1.2)
BUN SERPL-MCNC: 22 MG/DL (ref 6–23)
CALCIUM SERPL-MCNC: 9.1 MG/DL (ref 8.6–10.2)
CHLORIDE SERPL-SCNC: 105 MMOL/L (ref 98–107)
CO2 SERPL-SCNC: 28 MMOL/L (ref 22–29)
CREAT SERPL-MCNC: 1 MG/DL (ref 0.5–1)
EOSINOPHIL # BLD: 0.25 K/UL (ref 0.05–0.5)
EOSINOPHILS RELATIVE PERCENT: 5 % (ref 0–6)
ERYTHROCYTE [DISTWIDTH] IN BLOOD BY AUTOMATED COUNT: 12.7 % (ref 11.5–15)
GFR, ESTIMATED: 65 ML/MIN/1.73M2
GLUCOSE SERPL-MCNC: 97 MG/DL (ref 74–99)
HCT VFR BLD AUTO: 40.5 % (ref 34–48)
HGB BLD-MCNC: 13.5 G/DL (ref 11.5–15.5)
IMM GRANULOCYTES # BLD AUTO: <0.03 K/UL (ref 0–0.58)
IMM GRANULOCYTES NFR BLD: 0 % (ref 0–5)
LYMPHOCYTES NFR BLD: 1.43 K/UL (ref 1.5–4)
LYMPHOCYTES RELATIVE PERCENT: 27 % (ref 20–42)
MCH RBC QN AUTO: 30.6 PG (ref 26–35)
MCHC RBC AUTO-ENTMCNC: 33.3 G/DL (ref 32–34.5)
MCV RBC AUTO: 91.8 FL (ref 80–99.9)
MONOCYTES NFR BLD: 0.48 K/UL (ref 0.1–0.95)
MONOCYTES NFR BLD: 9 % (ref 2–12)
NEUTROPHILS NFR BLD: 58 % (ref 43–80)
NEUTS SEG NFR BLD: 3.12 K/UL (ref 1.8–7.3)
PLATELET # BLD AUTO: 178 K/UL (ref 130–450)
PMV BLD AUTO: 10.5 FL (ref 7–12)
POTASSIUM SERPL-SCNC: 4.3 MMOL/L (ref 3.5–5)
PROT SERPL-MCNC: 6.5 G/DL (ref 6.4–8.3)
RBC # BLD AUTO: 4.41 M/UL (ref 3.5–5.5)
SODIUM SERPL-SCNC: 140 MMOL/L (ref 132–146)
WBC OTHER # BLD: 5.4 K/UL (ref 4.5–11.5)

## 2024-08-05 PROCEDURE — 99213 OFFICE O/P EST LOW 20 MIN: CPT

## 2024-08-05 PROCEDURE — 80053 COMPREHEN METABOLIC PANEL: CPT

## 2024-08-05 PROCEDURE — 85025 COMPLETE CBC W/AUTO DIFF WBC: CPT

## 2024-08-05 PROCEDURE — 36415 COLL VENOUS BLD VENIPUNCTURE: CPT

## 2024-08-05 NOTE — PROGRESS NOTES
[facility]  [address] [place], [place] [address] [address]        Pt Name: Paige Torres  YOB: 1955  Date of evaluation: 8/5/2024  Primary Care Physician: Jayden Sotelo DO  Reason for evaluation:   No chief complaint on file.       Subjective:  Here for yearly follow up and results of mammogram and Dexa scan .  Feels well today. No c/o's.  Reports occasional dyspnea on exertion in relation to her moderate aortic regurgitation        OBJECTIVE:  VITALS:  height is 1.6 m (5' 3\") and weight is 77.6 kg (171 lb 1.6 oz). Her temperature is 97.6 °F (36.4 °C). Her blood pressure is 165/63 (abnormal) and her pulse is 55. Her oxygen saturation is 100%.   Physical Exam:  Performance Status: 0  Well developed, well nourished female  EYES: sclera non-icteric.   ENT: oropharynx clear.  BREASTS:  Mild firmness along scar in Left breast,no lumps or axillary nodes.   NECK: No lymphadenopathy.   HEART: Regular rate and rhthym.  LUNGS: Clear.   ABDOMEN: Soft, nontender. No ascites. No mass or organomegaly.  EXTREMITIES: without clubbing, cyanosis, or edema.  NEUROLOGIC: No focal deficits.  SKIN: No Rash.        Medications  Prior to Admission medications    Medication Sig Start Date End Date Taking? Authorizing Provider   vitamin B-6 (PYRIDOXINE) 100 MG tablet Take 1 tablet by mouth daily    Jose Rucker MD   furosemide (LASIX) 20 MG tablet TAKE ONE TABLET BY MOUTH DAILY 5/22/24   Luis Fernando Kitchen PA-C   lisinopril (PRINIVIL;ZESTRIL) 2.5 MG tablet TAKE ONE TABLET BY MOUTH EVERY DAY 5/10/24   Jayden Sotelo DO   metoprolol succinate (TOPROL XL) 25 MG extended release tablet TAKE ONE TABLET BY MOUTH EVERY DAY 12/6/23   Jayden Sotelo DO   Cholecalciferol 50 MCG (2000 UT) TABS Take 1 tablet by mouth daily 4/9/15   Jose Rucker MD   aspirin-acetaminophen-caffeine (EXCEDRIN MIGRAINE) 250-250-65 MG per tablet Take 1 tablet by mouth every 6 hours as needed for Headaches Patient takes as needed

## 2024-08-21 ENCOUNTER — OFFICE VISIT (OUTPATIENT)
Dept: SLEEP CENTER | Age: 69
End: 2024-08-21
Payer: MEDICARE

## 2024-08-21 VITALS
RESPIRATION RATE: 14 BRPM | SYSTOLIC BLOOD PRESSURE: 157 MMHG | OXYGEN SATURATION: 99 % | WEIGHT: 175.49 LBS | BODY MASS INDEX: 31.09 KG/M2 | HEIGHT: 63 IN | DIASTOLIC BLOOD PRESSURE: 65 MMHG | HEART RATE: 54 BPM

## 2024-08-21 DIAGNOSIS — G47.33 OSA (OBSTRUCTIVE SLEEP APNEA): Primary | ICD-10-CM

## 2024-08-21 DIAGNOSIS — I48.0 PAF (PAROXYSMAL ATRIAL FIBRILLATION) (HCC): ICD-10-CM

## 2024-08-21 PROCEDURE — 3017F COLORECTAL CA SCREEN DOC REV: CPT | Performed by: STUDENT IN AN ORGANIZED HEALTH CARE EDUCATION/TRAINING PROGRAM

## 2024-08-21 PROCEDURE — 1090F PRES/ABSN URINE INCON ASSESS: CPT | Performed by: STUDENT IN AN ORGANIZED HEALTH CARE EDUCATION/TRAINING PROGRAM

## 2024-08-21 PROCEDURE — G8417 CALC BMI ABV UP PARAM F/U: HCPCS | Performed by: STUDENT IN AN ORGANIZED HEALTH CARE EDUCATION/TRAINING PROGRAM

## 2024-08-21 PROCEDURE — 1036F TOBACCO NON-USER: CPT | Performed by: STUDENT IN AN ORGANIZED HEALTH CARE EDUCATION/TRAINING PROGRAM

## 2024-08-21 PROCEDURE — 99204 OFFICE O/P NEW MOD 45 MIN: CPT | Performed by: STUDENT IN AN ORGANIZED HEALTH CARE EDUCATION/TRAINING PROGRAM

## 2024-08-21 PROCEDURE — 1123F ACP DISCUSS/DSCN MKR DOCD: CPT | Performed by: STUDENT IN AN ORGANIZED HEALTH CARE EDUCATION/TRAINING PROGRAM

## 2024-08-21 PROCEDURE — 3078F DIAST BP <80 MM HG: CPT | Performed by: STUDENT IN AN ORGANIZED HEALTH CARE EDUCATION/TRAINING PROGRAM

## 2024-08-21 PROCEDURE — 3077F SYST BP >= 140 MM HG: CPT | Performed by: STUDENT IN AN ORGANIZED HEALTH CARE EDUCATION/TRAINING PROGRAM

## 2024-08-21 PROCEDURE — G8399 PT W/DXA RESULTS DOCUMENT: HCPCS | Performed by: STUDENT IN AN ORGANIZED HEALTH CARE EDUCATION/TRAINING PROGRAM

## 2024-08-21 PROCEDURE — G8427 DOCREV CUR MEDS BY ELIG CLIN: HCPCS | Performed by: STUDENT IN AN ORGANIZED HEALTH CARE EDUCATION/TRAINING PROGRAM

## 2024-08-21 ASSESSMENT — SLEEP AND FATIGUE QUESTIONNAIRES
HOW LIKELY ARE YOU TO NOD OFF OR FALL ASLEEP WHILE LYING DOWN TO REST IN THE AFTERNOON WHEN CIRCUMSTANCES PERMIT: MODERATE CHANCE OF DOZING
HOW LIKELY ARE YOU TO NOD OFF OR FALL ASLEEP WHEN YOU ARE A PASSENGER IN A CAR FOR AN HOUR WITHOUT A BREAK: HIGH CHANCE OF DOZING
HOW LIKELY ARE YOU TO NOD OFF OR FALL ASLEEP WHILE SITTING AND READING: SLIGHT CHANCE OF DOZING
HOW LIKELY ARE YOU TO NOD OFF OR FALL ASLEEP WHILE WATCHING TV: MODERATE CHANCE OF DOZING
HOW LIKELY ARE YOU TO NOD OFF OR FALL ASLEEP WHILE SITTING INACTIVE IN A PUBLIC PLACE: WOULD NEVER DOZE
HOW LIKELY ARE YOU TO NOD OFF OR FALL ASLEEP IN A CAR, WHILE STOPPED FOR A FEW MINUTES IN TRAFFIC: WOULD NEVER DOZE
ESS TOTAL SCORE: 8
HOW LIKELY ARE YOU TO NOD OFF OR FALL ASLEEP WHILE SITTING QUIETLY AFTER LUNCH WITHOUT ALCOHOL: WOULD NEVER DOZE

## 2024-08-21 NOTE — PROGRESS NOTES
[facility] Sleep Medicine    Patient Name: Paige Torres  Age: 69 y.o.   : 1955  Date of Visit: 24    Assessment and Plan:     1. BORIS (obstructive sleep apnea)  high pre-test probability of BORIS.We will pursue home sleep testing for further evaluation given symptoms listed below.    2. PAF (paroxysmal atrial fibrillation) (HCC)  Explained the relationship between atrial fibrillation and sleep apnea. Explained that on, average, sleep apnea may have four times the risk of developing atrial fibrillation. Additionally, BORIS showing that sleep apnea directly triggers arrhythmias during sleep due to the mechanical stresses and chemical changes each time a person with sleep apnea is startled awake by a lack of oxygen. We discussed that uncontrolled sleep apnea can possibly lead to unoptimal medication effectiveness for atrial fibrillation.     History:    HPI   Paige Torres is a 69 y.o. female with  has a past medical history of Alopecia areata (2021), Aneurysm of splenic artery (HCC), Atrial fibrillation (HCC), BRCA1 negative, BRCA2 negative, Breast cancer (HCC), Cancer (HCC), Graves' eye disease, History of tachycardia, History of therapeutic radiation, antineoplastic chemo, Hyperlipidemia, Hypertension, Obesity, Class I, BMI 30-34.9 (2023), Other congenital anomalies of pulmonary valve (2011), Splenic artery aneurysm (HCC) (2013), Vertigo (x1 after recent sinusitis), and Vitamin D deficiency (2021). who presents as a new patient to Sleep Clinic, referred by Dr. Sotelo, for Sleep Apnea and Pulmonary Hypertension    - Snoring, excessive daytime sleepiness, and witnessed apneas  - Potentially has had some choking/gasping episodes  - History of pAF  - No drug, tobacco, or alcohol use.  - No RLS/Bruxism    PMH:  Past Medical History:   Diagnosis Date    Alopecia areata 2021    Aneurysm of splenic artery (HCC)     Atrial fibrillation (HCC)     BRCA1 negative     BRCA2

## 2024-08-22 ENCOUNTER — HOSPITAL ENCOUNTER (OUTPATIENT)
Dept: CT IMAGING | Age: 69
Discharge: HOME | End: 2024-08-22
Attending: SURGERY
Payer: MEDICARE

## 2024-08-22 DIAGNOSIS — I72.8 SPLENIC ARTERY ANEURYSM (HCC): ICD-10-CM

## 2024-08-22 PROCEDURE — 74150 CT ABDOMEN W/O CONTRAST: CPT

## 2024-08-26 ENCOUNTER — HOSPITAL ENCOUNTER (OUTPATIENT)
Dept: SLEEP CENTER | Age: 69
Discharge: HOME | End: 2024-08-26
Payer: MEDICARE

## 2024-08-26 DIAGNOSIS — G47.33 OSA (OBSTRUCTIVE SLEEP APNEA): ICD-10-CM

## 2024-08-26 PROCEDURE — 95810 POLYSOM 6/> YRS 4/> PARAM: CPT

## 2024-09-09 ENCOUNTER — PREP FOR PROCEDURE (OUTPATIENT)
Dept: SURGERY | Age: 69
End: 2024-09-09

## 2024-09-09 DIAGNOSIS — Z85.3 PERSONAL HISTORY OF BREAST CANCER: ICD-10-CM

## 2024-09-10 ENCOUNTER — OFFICE VISIT (OUTPATIENT)
Dept: VASCULAR SURGERY | Age: 69
End: 2024-09-10
Payer: MEDICARE

## 2024-09-10 DIAGNOSIS — I72.8 SPLENIC ARTERY ANEURYSM (HCC): Primary | ICD-10-CM

## 2024-09-10 PROCEDURE — 99212 OFFICE O/P EST SF 10 MIN: CPT | Performed by: PHYSICIAN ASSISTANT

## 2024-09-10 PROCEDURE — 1123F ACP DISCUSS/DSCN MKR DOCD: CPT | Performed by: PHYSICIAN ASSISTANT

## 2024-09-10 PROCEDURE — G8427 DOCREV CUR MEDS BY ELIG CLIN: HCPCS | Performed by: PHYSICIAN ASSISTANT

## 2024-09-10 PROCEDURE — G8417 CALC BMI ABV UP PARAM F/U: HCPCS | Performed by: PHYSICIAN ASSISTANT

## 2024-09-10 PROCEDURE — 1090F PRES/ABSN URINE INCON ASSESS: CPT | Performed by: PHYSICIAN ASSISTANT

## 2024-09-10 PROCEDURE — G8399 PT W/DXA RESULTS DOCUMENT: HCPCS | Performed by: PHYSICIAN ASSISTANT

## 2024-09-10 PROCEDURE — 3017F COLORECTAL CA SCREEN DOC REV: CPT | Performed by: PHYSICIAN ASSISTANT

## 2024-09-10 PROCEDURE — 1036F TOBACCO NON-USER: CPT | Performed by: PHYSICIAN ASSISTANT

## 2024-09-11 NOTE — H&P
Department of Plastic Surgery - Adult  Attending Consult Note              CHIEF COMPLAINT:  History of left breast cancer     History Obtained From:  patient     HISTORY OF PRESENT ILLNESS:                 The patient is a 68 y.o. female who presents with History of left breast cancer.  Patient states that she had a lumpectomy and radiation therapy to the left breast about 15 years prior.  She states that she has never had any matching or reconstructive procedures to the right breast.  She presents today with complaints of right breast asymmetry as well and has desire for a right breast reduction.  She states she continues to follow with her cardiologist at this time is planned to see them again in September 2024.  She did have a mammogram this year which was BI-RADS Category 2.     Past Medical History:    Past Medical History        Past Medical History:   Diagnosis Date    Alopecia areata 12/03/2021    Aneurysm of splenic artery (HCC)      Atrial fibrillation (HCC)      BRCA1 negative      BRCA2 negative      Breast cancer (HCC)      Cancer (HCC)       left breast    Graves' eye disease       left eye    History of tachycardia      History of therapeutic radiation      Hx antineoplastic chemo      Hyperlipidemia      Hypertension      Obesity, Class I, BMI 30-34.9 11/07/2023    Other congenital anomalies of pulmonary valve 01/20/2011    Splenic artery aneurysm (HCC) 05/21/2013    Vertigo x1 after recent sinusitis    Vitamin D deficiency 12/03/2021         Past Surgical History:    Past Surgical History         Past Surgical History:   Procedure Laterality Date    ATRIAL ABLATION SURGERY        BREAST BIOPSY Left      BREAST LUMPECTOMY Left      BREAST SURGERY         lumpectomy + lymph node dissection    EYE SURGERY Left       muscle surgery    HYSTERECTOMY (CERVIX STATUS UNKNOWN)             Current Medications:      Current Facility-Administered Medications          Current Outpatient Medications   Medication  grossly intact. No signs of agitated mood.   LUNGS:  No increased work of breathing, good air exchange, clear to auscultation bilaterally, no crackles or wheezing  CARDIOVASCULAR:  Normal apical impulse, regular rate and rhythm,   ABDOMEN:  Normal bowel sounds, soft, non-distended, non-tender,      LEFT BREAST: Rash is not noted, There are no masses palpated, no axillary lymphadenopathy, no nipple discharge. No breast pain. There are previous scars.      RIGHT BREAST: Rash is not noted, There are no masses palpated, no axillary lymphadenopathy, no nipple discharge. No breast pain. There are no previous scars-        DATA:    Radiology Review:  EXAMINATION:  SCREENING DIGITAL BILATERAL MAMMOGRAM WITH TOMOSYNTHESIS, 1/9/2024     TECHNIQUE:  Screening mammography of the bilateral breasts was performed with  tomosynthesis.  2D standard and 3D tomosynthesis combination imaging  performed through both breasts in the MLO and CC projection.  Computer aided  detection was utilized in the interpretation of this exam.     COMPARISON:  Mammogram December 19, 2022     HISTORY:  Screening.  Prior history of left breast cancer     FINDINGS:  There are scattered areas of fibroglandular density.  Postsurgical appearance  left breast is similar to prior.  There are no suspicious masses or  calcifications in either breast.     IMPRESSION:  No findings concerning for malignancy bilaterally     Recommendation: Annual mammogram     BIRADS:  BIRADS - CATEGORY 2 Benign  Breast Measurements were taken and are noted in the media section.     IMPRESSION/RECOMMENDATIONS:       Diagnosis - history of left breast cancer     I discussed with the patient today a right breast reduction as she enjoys the parenchyma of her left breast matching procedure will be performed.     Patient understands that she will need preoperative clearance prior to her surgical intervention which she would like to have in the end of September for October 2024.

## 2024-09-17 ENCOUNTER — OFFICE VISIT (OUTPATIENT)
Dept: PRIMARY CARE CLINIC | Age: 69
End: 2024-09-17
Payer: MEDICARE

## 2024-09-17 VITALS
HEART RATE: 58 BPM | WEIGHT: 174 LBS | TEMPERATURE: 98 F | HEIGHT: 63 IN | OXYGEN SATURATION: 98 % | SYSTOLIC BLOOD PRESSURE: 116 MMHG | DIASTOLIC BLOOD PRESSURE: 84 MMHG | BODY MASS INDEX: 30.83 KG/M2

## 2024-09-17 DIAGNOSIS — M94.0 COSTOCHONDRITIS: ICD-10-CM

## 2024-09-17 DIAGNOSIS — I10 ESSENTIAL HYPERTENSION: ICD-10-CM

## 2024-09-17 DIAGNOSIS — K42.9 PARAUMBILICAL HERNIA: ICD-10-CM

## 2024-09-17 DIAGNOSIS — Z01.818 PRE-OP EVALUATION: Primary | ICD-10-CM

## 2024-09-17 DIAGNOSIS — R14.0 ABDOMINAL BLOATING: ICD-10-CM

## 2024-09-17 DIAGNOSIS — M48.061 SPINAL STENOSIS, LUMBAR REGION, WITHOUT NEUROGENIC CLAUDICATION: ICD-10-CM

## 2024-09-17 DIAGNOSIS — I07.1 MODERATE TRICUSPID REGURGITATION: ICD-10-CM

## 2024-09-17 PROCEDURE — G8399 PT W/DXA RESULTS DOCUMENT: HCPCS | Performed by: FAMILY MEDICINE

## 2024-09-17 PROCEDURE — 3079F DIAST BP 80-89 MM HG: CPT | Performed by: FAMILY MEDICINE

## 2024-09-17 PROCEDURE — 1123F ACP DISCUSS/DSCN MKR DOCD: CPT | Performed by: FAMILY MEDICINE

## 2024-09-17 PROCEDURE — G8427 DOCREV CUR MEDS BY ELIG CLIN: HCPCS | Performed by: FAMILY MEDICINE

## 2024-09-17 PROCEDURE — 99214 OFFICE O/P EST MOD 30 MIN: CPT | Performed by: FAMILY MEDICINE

## 2024-09-17 PROCEDURE — 3074F SYST BP LT 130 MM HG: CPT | Performed by: FAMILY MEDICINE

## 2024-09-17 PROCEDURE — G8417 CALC BMI ABV UP PARAM F/U: HCPCS | Performed by: FAMILY MEDICINE

## 2024-09-17 PROCEDURE — 1036F TOBACCO NON-USER: CPT | Performed by: FAMILY MEDICINE

## 2024-09-17 PROCEDURE — G0008 ADMIN INFLUENZA VIRUS VAC: HCPCS | Performed by: FAMILY MEDICINE

## 2024-09-17 PROCEDURE — 90653 IIV ADJUVANT VACCINE IM: CPT | Performed by: FAMILY MEDICINE

## 2024-09-17 PROCEDURE — 3017F COLORECTAL CA SCREEN DOC REV: CPT | Performed by: FAMILY MEDICINE

## 2024-09-17 PROCEDURE — 1090F PRES/ABSN URINE INCON ASSESS: CPT | Performed by: FAMILY MEDICINE

## 2024-09-17 ASSESSMENT — ENCOUNTER SYMPTOMS
PHOTOPHOBIA: 0
ABDOMINAL DISTENTION: 0
APNEA: 1
ABDOMINAL PAIN: 0
DIARRHEA: 0
EYE ITCHING: 0
VOMITING: 0
CONSTIPATION: 0
SINUS PRESSURE: 0
EYE DISCHARGE: 0
WHEEZING: 0
BACK PAIN: 1
SORE THROAT: 0
COLOR CHANGE: 0
RHINORRHEA: 0
BLOOD IN STOOL: 0
COUGH: 0
EYE PAIN: 0
SHORTNESS OF BREATH: 0
NAUSEA: 0
FACIAL SWELLING: 0

## 2024-09-24 NOTE — PROGRESS NOTES
[facility]   PRE-ADMISSION TESTING GENERAL INSTRUCTIONS  PAT Phone Number: [phone]      GENERAL INSTRUCTIONS:    [x] Antibacterial Soap Shower Night before AND the Morning of procedure.  [] The Night Before Surgery: Take an antibacterial soap shower - followed by CHG Wipes.   -The Morning of Surgery: Repeat CHG Wipes.  [x] Do not wear makeup, lotions, powders, deodorant the morning of surgery.  [x] No solid food after midnight. You may have SIPS of clear liquids up until 2 hours before your arrival time to the hospital.   [x] You may brush your teeth, gargle, but do not swallow water.   [x] No tobacco products, illegal drugs, or alcohol within 24 hours of your surgery.  [x] Jewelry or valuables should not be brought to the hospital. All body and/or tongue piercing's must be removed prior to arriving to hospital. No contact lens or hair pins.   [x] Arrange transportation with a responsible adult  to and from the hospital. Arrange for someone to be with you for the remainder of the day and for 24 hours after your procedure due to having had anesthesia.          -Who will be your  for transportation?  - David        -Who will be staying with you for 24 hrs after your procedure?  - David  [x] Bring insurance card and photo ID.  [] Bring copy of living will or healthcare power of  papers to be placed in your electronic record.  [] Urine Pregnancy test will be preformed the day of surgery. A specimen sample may be brought from home.  [] Transfusion (Green) Bracelet: Please bring with you to hospital, day of surgery.     PARKING INSTRUCTIONS:     [x] ARRIVAL DATE & TIME: 10/01/24 at 0500 am  [x] Times are subject to change. We will contact you the business day before surgery if that were to occur.  [x] Enter into the [facility] Entrance. Two people may accompany you. Masks are not required.  [x] Parking Lot \"I\" is where you will park. It is located on

## 2024-09-30 ENCOUNTER — ANESTHESIA EVENT (OUTPATIENT)
Dept: OPERATING ROOM | Age: 69
End: 2024-09-30
Payer: MEDICARE

## 2024-10-01 ENCOUNTER — HOSPITAL ENCOUNTER (OUTPATIENT)
Age: 69
Setting detail: OUTPATIENT SURGERY
Discharge: HOME | End: 2024-10-01
Attending: PLASTIC SURGERY | Admitting: PLASTIC SURGERY
Payer: MEDICARE

## 2024-10-01 ENCOUNTER — ANESTHESIA (OUTPATIENT)
Dept: OPERATING ROOM | Age: 69
End: 2024-10-01
Payer: MEDICARE

## 2024-10-01 VITALS
OXYGEN SATURATION: 97 % | TEMPERATURE: 97 F | HEIGHT: 63 IN | DIASTOLIC BLOOD PRESSURE: 78 MMHG | RESPIRATION RATE: 21 BRPM | SYSTOLIC BLOOD PRESSURE: 124 MMHG | BODY MASS INDEX: 30.12 KG/M2 | WEIGHT: 170 LBS | HEART RATE: 63 BPM

## 2024-10-01 DIAGNOSIS — Z85.3 PERSONAL HISTORY OF BREAST CANCER: Primary | ICD-10-CM

## 2024-10-01 DIAGNOSIS — G89.18 POST-OP PAIN: ICD-10-CM

## 2024-10-01 LAB
ANION GAP SERPL CALCULATED.3IONS-SCNC: 11 MMOL/L (ref 7–16)
BUN SERPL-MCNC: 18 MG/DL (ref 6–23)
CALCIUM SERPL-MCNC: 9.1 MG/DL (ref 8.6–10.2)
CHLORIDE SERPL-SCNC: 105 MMOL/L (ref 98–107)
CO2 SERPL-SCNC: 24 MMOL/L (ref 22–29)
CREAT SERPL-MCNC: 1 MG/DL (ref 0.5–1)
GFR, ESTIMATED: 62 ML/MIN/1.73M2
GLUCOSE SERPL-MCNC: 108 MG/DL (ref 74–99)
POTASSIUM SERPL-SCNC: 4.4 MMOL/L (ref 3.5–5)
SODIUM SERPL-SCNC: 140 MMOL/L (ref 132–146)

## 2024-10-01 PROCEDURE — 19318 BREAST REDUCTION: CPT | Performed by: PHYSICIAN ASSISTANT

## 2024-10-01 PROCEDURE — 7100000010 HC PHASE II RECOVERY - FIRST 15 MIN: Performed by: PLASTIC SURGERY

## 2024-10-01 PROCEDURE — 2709999900 HC NON-CHARGEABLE SUPPLY: Performed by: PLASTIC SURGERY

## 2024-10-01 PROCEDURE — C1713 ANCHOR/SCREW BN/BN,TIS/BN: HCPCS | Performed by: PLASTIC SURGERY

## 2024-10-01 PROCEDURE — 7100000001 HC PACU RECOVERY - ADDTL 15 MIN: Performed by: PLASTIC SURGERY

## 2024-10-01 PROCEDURE — 88305 TISSUE EXAM BY PATHOLOGIST: CPT

## 2024-10-01 PROCEDURE — 2500000003 HC RX 250 WO HCPCS: Performed by: ANESTHESIOLOGY

## 2024-10-01 PROCEDURE — 3700000000 HC ANESTHESIA ATTENDED CARE: Performed by: PLASTIC SURGERY

## 2024-10-01 PROCEDURE — 6360000002 HC RX W HCPCS

## 2024-10-01 PROCEDURE — 6370000000 HC RX 637 (ALT 250 FOR IP): Performed by: ANESTHESIOLOGY

## 2024-10-01 PROCEDURE — 3700000001 HC ADD 15 MINUTES (ANESTHESIA): Performed by: PLASTIC SURGERY

## 2024-10-01 PROCEDURE — 19318 BREAST REDUCTION: CPT | Performed by: PLASTIC SURGERY

## 2024-10-01 PROCEDURE — 7100000011 HC PHASE II RECOVERY - ADDTL 15 MIN: Performed by: PLASTIC SURGERY

## 2024-10-01 PROCEDURE — 7100000000 HC PACU RECOVERY - FIRST 15 MIN: Performed by: PLASTIC SURGERY

## 2024-10-01 PROCEDURE — 2500000003 HC RX 250 WO HCPCS

## 2024-10-01 PROCEDURE — 3600000013 HC SURGERY LEVEL 3 ADDTL 15MIN: Performed by: PLASTIC SURGERY

## 2024-10-01 PROCEDURE — 3600000003 HC SURGERY LEVEL 3 BASE: Performed by: PLASTIC SURGERY

## 2024-10-01 PROCEDURE — 2720000010 HC SURG SUPPLY STERILE: Performed by: PLASTIC SURGERY

## 2024-10-01 PROCEDURE — 80048 BASIC METABOLIC PNL TOTAL CA: CPT

## 2024-10-01 PROCEDURE — 2500000003 HC RX 250 WO HCPCS: Performed by: PLASTIC SURGERY

## 2024-10-01 PROCEDURE — 2580000003 HC RX 258: Performed by: PLASTIC SURGERY

## 2024-10-01 PROCEDURE — 6360000002 HC RX W HCPCS: Performed by: PLASTIC SURGERY

## 2024-10-01 RX ORDER — NALOXONE HYDROCHLORIDE 0.4 MG/ML
INJECTION, SOLUTION INTRAMUSCULAR; INTRAVENOUS; SUBCUTANEOUS PRN
Status: DISCONTINUED | OUTPATIENT
Start: 2024-10-01 | End: 2024-10-01 | Stop reason: HOSPADM

## 2024-10-01 RX ORDER — MIDAZOLAM HYDROCHLORIDE 1 MG/ML
INJECTION INTRAMUSCULAR; INTRAVENOUS
Status: DISCONTINUED | OUTPATIENT
Start: 2024-10-01 | End: 2024-10-01 | Stop reason: SDUPTHER

## 2024-10-01 RX ORDER — SODIUM CHLORIDE 9 MG/ML
INJECTION, SOLUTION INTRAVENOUS CONTINUOUS
Status: DISCONTINUED | OUTPATIENT
Start: 2024-10-01 | End: 2024-10-01 | Stop reason: HOSPADM

## 2024-10-01 RX ORDER — ONDANSETRON 4 MG/1
4 TABLET, FILM COATED ORAL DAILY PRN
Qty: 12 TABLET | Refills: 1 | Status: SHIPPED | OUTPATIENT
Start: 2024-10-01

## 2024-10-01 RX ORDER — ACETAMINOPHEN 500 MG
1000 TABLET ORAL ONCE
Status: COMPLETED | OUTPATIENT
Start: 2024-10-01 | End: 2024-10-01

## 2024-10-01 RX ORDER — OXYCODONE HYDROCHLORIDE 5 MG/1
10 TABLET ORAL PRN
Status: COMPLETED | OUTPATIENT
Start: 2024-10-01 | End: 2024-10-01

## 2024-10-01 RX ORDER — HYDROMORPHONE HYDROCHLORIDE 1 MG/ML
0.25 INJECTION, SOLUTION INTRAMUSCULAR; INTRAVENOUS; SUBCUTANEOUS EVERY 5 MIN PRN
Status: DISCONTINUED | OUTPATIENT
Start: 2024-10-01 | End: 2024-10-01 | Stop reason: HOSPADM

## 2024-10-01 RX ORDER — CEPHALEXIN 500 MG/1
500 CAPSULE ORAL 4 TIMES DAILY
Qty: 20 CAPSULE | Refills: 0 | Status: SHIPPED | OUTPATIENT
Start: 2024-10-01 | End: 2024-10-06

## 2024-10-01 RX ORDER — GLYCOPYRROLATE 0.2 MG/ML
INJECTION INTRAMUSCULAR; INTRAVENOUS
Status: DISCONTINUED | OUTPATIENT
Start: 2024-10-01 | End: 2024-10-01 | Stop reason: SDUPTHER

## 2024-10-01 RX ORDER — LIDOCAINE HYDROCHLORIDE 20 MG/ML
INJECTION, SOLUTION INTRAVENOUS
Status: DISCONTINUED | OUTPATIENT
Start: 2024-10-01 | End: 2024-10-01 | Stop reason: SDUPTHER

## 2024-10-01 RX ORDER — SODIUM CHLORIDE 9 MG/ML
INJECTION, SOLUTION INTRAVENOUS PRN
Status: DISCONTINUED | OUTPATIENT
Start: 2024-10-01 | End: 2024-10-01 | Stop reason: HOSPADM

## 2024-10-01 RX ORDER — OXYCODONE HYDROCHLORIDE 5 MG/1
5 TABLET ORAL PRN
Status: COMPLETED | OUTPATIENT
Start: 2024-10-01 | End: 2024-10-01

## 2024-10-01 RX ORDER — OXYCODONE AND ACETAMINOPHEN 5; 325 MG/1; MG/1
1 TABLET ORAL EVERY 6 HOURS PRN
Qty: 15 TABLET | Refills: 0 | Status: SHIPPED | OUTPATIENT
Start: 2024-10-01 | End: 2024-10-08

## 2024-10-01 RX ORDER — SODIUM CHLORIDE 0.9 % (FLUSH) 0.9 %
5-40 SYRINGE (ML) INJECTION EVERY 12 HOURS SCHEDULED
Status: DISCONTINUED | OUTPATIENT
Start: 2024-10-01 | End: 2024-10-01 | Stop reason: HOSPADM

## 2024-10-01 RX ORDER — BUPIVACAINE HYDROCHLORIDE AND EPINEPHRINE 2.5; 5 MG/ML; UG/ML
INJECTION, SOLUTION EPIDURAL; INFILTRATION; INTRACAUDAL; PERINEURAL PRN
Status: DISCONTINUED | OUTPATIENT
Start: 2024-10-01 | End: 2024-10-01 | Stop reason: ALTCHOICE

## 2024-10-01 RX ORDER — PROPOFOL 10 MG/ML
INJECTION, EMULSION INTRAVENOUS
Status: DISCONTINUED | OUTPATIENT
Start: 2024-10-01 | End: 2024-10-01 | Stop reason: SDUPTHER

## 2024-10-01 RX ORDER — SODIUM CHLORIDE 0.9 % (FLUSH) 0.9 %
5-40 SYRINGE (ML) INJECTION PRN
Status: DISCONTINUED | OUTPATIENT
Start: 2024-10-01 | End: 2024-10-01 | Stop reason: HOSPADM

## 2024-10-01 RX ORDER — FENTANYL CITRATE 50 UG/ML
INJECTION, SOLUTION INTRAMUSCULAR; INTRAVENOUS
Status: DISCONTINUED | OUTPATIENT
Start: 2024-10-01 | End: 2024-10-01 | Stop reason: SDUPTHER

## 2024-10-01 RX ORDER — HYDROMORPHONE HYDROCHLORIDE 1 MG/ML
0.5 INJECTION, SOLUTION INTRAMUSCULAR; INTRAVENOUS; SUBCUTANEOUS EVERY 5 MIN PRN
Status: COMPLETED | OUTPATIENT
Start: 2024-10-01 | End: 2024-10-01

## 2024-10-01 RX ORDER — DIPHENHYDRAMINE HYDROCHLORIDE 50 MG/ML
INJECTION INTRAMUSCULAR; INTRAVENOUS
Status: DISCONTINUED | OUTPATIENT
Start: 2024-10-01 | End: 2024-10-01 | Stop reason: SDUPTHER

## 2024-10-01 RX ORDER — DIPHENHYDRAMINE HYDROCHLORIDE 50 MG/ML
12.5 INJECTION INTRAMUSCULAR; INTRAVENOUS
Status: DISCONTINUED | OUTPATIENT
Start: 2024-10-01 | End: 2024-10-01 | Stop reason: HOSPADM

## 2024-10-01 RX ORDER — PHENYLEPHRINE HCL IN 0.9% NACL 1 MG/10 ML
SYRINGE (ML) INTRAVENOUS
Status: DISCONTINUED | OUTPATIENT
Start: 2024-10-01 | End: 2024-10-01 | Stop reason: SDUPTHER

## 2024-10-01 RX ORDER — ONDANSETRON 2 MG/ML
INJECTION INTRAMUSCULAR; INTRAVENOUS
Status: DISCONTINUED | OUTPATIENT
Start: 2024-10-01 | End: 2024-10-01 | Stop reason: SDUPTHER

## 2024-10-01 RX ORDER — DEXAMETHASONE SODIUM PHOSPHATE 10 MG/ML
INJECTION INTRAMUSCULAR; INTRAVENOUS
Status: DISCONTINUED | OUTPATIENT
Start: 2024-10-01 | End: 2024-10-01 | Stop reason: SDUPTHER

## 2024-10-01 RX ORDER — MEPERIDINE HYDROCHLORIDE 25 MG/ML
12.5 INJECTION INTRAMUSCULAR; INTRAVENOUS; SUBCUTANEOUS EVERY 5 MIN PRN
Status: DISCONTINUED | OUTPATIENT
Start: 2024-10-01 | End: 2024-10-01 | Stop reason: HOSPADM

## 2024-10-01 RX ADMIN — Medication 25 MG: at 07:36

## 2024-10-01 RX ADMIN — FENTANYL CITRATE 100 MCG: 0.05 INJECTION, SOLUTION INTRAMUSCULAR; INTRAVENOUS at 07:36

## 2024-10-01 RX ADMIN — Medication 100 MCG: at 07:41

## 2024-10-01 RX ADMIN — SODIUM CHLORIDE: 9 INJECTION, SOLUTION INTRAVENOUS at 05:31

## 2024-10-01 RX ADMIN — MIDAZOLAM 1 MG: 1 INJECTION INTRAMUSCULAR; INTRAVENOUS at 07:23

## 2024-10-01 RX ADMIN — FENTANYL CITRATE 50 MCG: 0.05 INJECTION, SOLUTION INTRAMUSCULAR; INTRAVENOUS at 08:38

## 2024-10-01 RX ADMIN — HYDROMORPHONE HYDROCHLORIDE 0.5 MG: 1 INJECTION, SOLUTION INTRAMUSCULAR; INTRAVENOUS; SUBCUTANEOUS at 09:21

## 2024-10-01 RX ADMIN — ACETAMINOPHEN 1000 MG: 500 TABLET ORAL at 06:46

## 2024-10-01 RX ADMIN — DEXAMETHASONE SODIUM PHOSPHATE 10 MG: 10 INJECTION INTRAMUSCULAR; INTRAVENOUS at 07:48

## 2024-10-01 RX ADMIN — OXYCODONE HYDROCHLORIDE 5 MG: 5 TABLET ORAL at 11:18

## 2024-10-01 RX ADMIN — HYDROMORPHONE HYDROCHLORIDE 0.5 MG: 1 INJECTION, SOLUTION INTRAMUSCULAR; INTRAVENOUS; SUBCUTANEOUS at 09:29

## 2024-10-01 RX ADMIN — CEFAZOLIN 2000 MG: 2 INJECTION, POWDER, FOR SOLUTION INTRAMUSCULAR; INTRAVENOUS at 07:43

## 2024-10-01 RX ADMIN — ONDANSETRON 4 MG: 2 INJECTION INTRAMUSCULAR; INTRAVENOUS at 07:48

## 2024-10-01 RX ADMIN — LIDOCAINE HYDROCHLORIDE 100 MG: 20 INJECTION, SOLUTION INTRAVENOUS at 07:36

## 2024-10-01 RX ADMIN — DIPHENHYDRAMINE HYDROCHLORIDE 12.5 MG: 50 INJECTION INTRAMUSCULAR; INTRAVENOUS at 07:52

## 2024-10-01 RX ADMIN — PROPOFOL 100 MG: 10 INJECTION, EMULSION INTRAVENOUS at 07:36

## 2024-10-01 RX ADMIN — GLYCOPYRROLATE 0.2 MG: 0.2 INJECTION INTRAMUSCULAR; INTRAVENOUS at 07:42

## 2024-10-01 RX ADMIN — Medication 100 MCG: at 08:11

## 2024-10-01 ASSESSMENT — PAIN DESCRIPTION - DESCRIPTORS
DESCRIPTORS: ACHING;DISCOMFORT
DESCRIPTORS: ACHING;DISCOMFORT
DESCRIPTORS: ACHING;BURNING;CRAMPING

## 2024-10-01 ASSESSMENT — PAIN DESCRIPTION - LOCATION
LOCATION: BREAST
LOCATION: BREAST

## 2024-10-01 ASSESSMENT — PAIN - FUNCTIONAL ASSESSMENT
PAIN_FUNCTIONAL_ASSESSMENT: 0-10
PAIN_FUNCTIONAL_ASSESSMENT: 0-10
PAIN_FUNCTIONAL_ASSESSMENT: NONE - DENIES PAIN
PAIN_FUNCTIONAL_ASSESSMENT: 0-10

## 2024-10-01 ASSESSMENT — PAIN SCALES - GENERAL
PAINLEVEL_OUTOF10: 8
PAINLEVEL_OUTOF10: 6

## 2024-10-01 NOTE — OP NOTE
Operative Note      Patient: Paige Torres  YOB: 1955  MRN: [id number]    Date of Procedure: 10/1/2024    Pre-Op Diagnosis Codes:      * Personal history of breast cancer [Z85.3]    Post-Op Diagnosis: Same       Procedure(s):  Right Breast Reduction Matching    Surgeon(s):  Aaron Arango MD    Assistant:   Physician Assistant: Jeff Garcia PA    Anesthesia: General    Estimated Blood Loss (mL): 50cc    Complications: None    Specimens:   ID Type Source Tests Collected by Time Destination   A : RIGHT BREAST TISSUE Tissue Breast SURGICAL PATHOLOGY Aaron Arango MD 10/1/2024 0810        Implants:  * No implants in log *      Drains: * No LDAs found *    Findings:  Infection Present At Time Of Surgery (PATOS) (choose all levels that have infection present):  No infection present    Detailed Description of Procedure:                 ASSISTANT:  Jeff Garcia PA-C.  PA was required for the case due  to lack of adequately trained assistant; was involved in prepping,  draping, retracting, dressing and suturing.       SPECIMEN:  Bilateral breast skin and tissue. Resected weight  on the right side 319.8 gm.             INDICATIONS:  The patient is a 69 y.o. female with history of breast cancer.   Patient underwent a left lumpectomy with subsequent radiation.  Patient developed significant asymmetries with the left breast much more contracted and smaller than the contralateral side.  The patient elected to proceed with right matching breast reduction.  Risks, benefits, and alternatives were explained to her including but not limited to bleeding, scarring, infection, DVT, anesthesia related complications, death, and need for further surgery.  She understood and elected to proceed.     OPERATIVE PROCEDURE:  She was seen on the day of surgery, marked in the holding area in the standing position.  The right l breast was marked with a wise pattern skin incision. The nipple and areolar

## 2024-10-01 NOTE — ANESTHESIA PRE PROCEDURE
left eye   • History of tachycardia    • History of therapeutic radiation    • Hx antineoplastic chemo    • Hyperlipidemia    • Hypertension    • Obesity, Class I, BMI 30-34.9 11/07/2023   • Other congenital anomalies of pulmonary valve 01/20/2011   • Splenic artery aneurysm (HCC) 05/21/2013   • Vertigo x1 after recent sinusitis   • Vitamin D deficiency 12/03/2021       Past Surgical History:        Procedure Laterality Date   • ATRIAL ABLATION SURGERY     • BREAST BIOPSY Left    • BREAST LUMPECTOMY Left    • BREAST SURGERY Left 2009    lumpectomy + lymph node dissection   • EYE SURGERY Left     muscle surgery   • HYSTERECTOMY, TOTAL ABDOMINAL (CERVIX REMOVED) N/A        Social History:    Social History     Tobacco Use   • Smoking status: Never   • Smokeless tobacco: Never   Substance Use Topics   • Alcohol use: No     Comment: occassionally-rare                                Counseling given: Not Answered      Vital Signs (Current):   Vitals:    09/24/24 1430 10/01/24 0507 10/01/24 0516 10/01/24 0533   BP:   (!) 197/86 (!) 169/78   Pulse:   64    Resp:   18    Temp:   97.6 °F (36.4 °C)    TempSrc:   Temporal    SpO2:   97%    Weight: 77.1 kg (170 lb) 77.1 kg (170 lb)     Height:  1.6 m (5' 3\")                                                BP Readings from Last 3 Encounters:   10/01/24 (!) 169/78   09/17/24 116/84   08/21/24 (!) 157/65       NPO Status: Time of last liquid consumption: 2345                        Time of last solid consumption: 1830                        Date of last liquid consumption: 09/30/24                        Date of last solid food consumption: 09/30/24    BMI:   Wt Readings from Last 3 Encounters:   10/01/24 77.1 kg (170 lb)   09/17/24 78.9 kg (174 lb)   08/21/24 79.6 kg (175 lb 7.8 oz)     Body mass index is 30.11 kg/m².    CBC:   Lab Results   Component Value Date/Time    WBC 5.4 08/05/2024 09:06 AM    RBC 4.41 08/05/2024 09:06 AM    HGB 13.5 08/05/2024 09:06 AM    HCT 40.5

## 2024-10-01 NOTE — DISCHARGE INSTRUCTIONS
Discharge Home.   Call office to schedule a follow-up appointment at [phone]  Please call to schedule an appointment to be seen In our office on ***  Diet: regular diet  Activity: ambulate in house and no Strenuous exercise for 1 week    Shower/Bathing:  You can shower in 48 hours over the incision sites.  At that time you can allow soap and water to rinse off the incision sites while showering.  Once you are done in the shower you can pat dry the incision sites with a clean paper towel.  No baths, hot tubs or soaking of the wound site at this time.  It is okay for the Steri-Strips to become wet in the shower.  These can be dried with a clean paper towel.    Dressings /Wound Care:Keep wound clean and dry.  There is no need to remove your steri strips.  Your surgical bra needs to be worn at all times.  Your bra may become saturated with drainage this is to be expected.  The bra can be off for period of time to have it washed and dried.  You can use gauze padding as needed for comfort under the surgical bra.  This gauze dressing can be changed as needed if the gauze becomes saturated.  Your surgical bra should be worn at all times for aiding in compression to the bilateral breasts.  The surgical bra should be snug fitting at all times.  At any point during the day if you notice any sudden changes to the appearance of the wound or the site operated on you will need to contact our office.  This may include opening of wound, pus, changes in size, color etc.    Things you need to call your doctor for:  Changes in the symmetry or size of your breast.  If one of your breast becomes larger than the other after surgery this is something that you need to call our office for.  You will also need to call our office if you note any changes in the color or consistency of the nipple and areola.  Drainage is to be expected around the nipple and areola but if changes to the nipple itself occur please contact our

## 2024-10-01 NOTE — ANESTHESIA POSTPROCEDURE EVALUATION
Department of Anesthesiology  Postprocedure Note    Patient: Paige Torres  MRN: [id number]  YOB: 1955  Date of evaluation: 10/1/2024    Procedure Summary       Date: 10/01/24 Room / Location: [address]    Anesthesia Start: 0722 Anesthesia Stop: 0840    Procedure: Right Breast Reduction Matching (Right: Breast) Diagnosis:       Personal history of breast cancer      (Personal history of breast cancer [Z85.3])    Surgeons: Aaron Arango MD Responsible Provider: Lucio Hernandez MD    Anesthesia Type: General ASA Status: 4            Anesthesia Type: General    Jose Phase I: Jose Score: 9    Jose Phase II: Jose Score: 10    Anesthesia Post Evaluation    Patient location during evaluation: PACU  Patient participation: complete - patient participated  Level of consciousness: awake and alert  Airway patency: patent  Nausea & Vomiting: no nausea and no vomiting  Cardiovascular status: hemodynamically stable  Respiratory status: acceptable  Hydration status: euvolemic  Multimodal analgesia pain management approach  Pain management: adequate    No notable events documented.

## 2024-10-04 ENCOUNTER — TELEPHONE (OUTPATIENT)
Dept: SURGERY | Age: 69
End: 2024-10-04

## 2024-10-04 LAB — SURGICAL PATHOLOGY REPORT: NORMAL

## 2024-10-04 NOTE — TELEPHONE ENCOUNTER
Office spoke with patient using extra gauze pad, monitoring , and may try benadryl for itching, patient aware if absolutely needs to remove steri strips may do so gently and cover with gauze change as needed. Patient has follow up on Wednesday aware she may come in on Monday if she would like.

## 2024-10-04 NOTE — TELEPHONE ENCOUNTER
Patient called office underneath on right breast side very \"itchy\" , bra digging and rolling up very uncomfortable, on first hook uses clean dry gauze to pad not helping sometimes tape will both skin, no difficulty breathing, no fever redness no warm to touch. Patient will monitor and add additional gauze to try to help with discomfort. Please advise on itchiness.

## 2024-10-07 DIAGNOSIS — G47.33 OSA (OBSTRUCTIVE SLEEP APNEA): Primary | ICD-10-CM

## 2024-10-09 ENCOUNTER — OFFICE VISIT (OUTPATIENT)
Dept: SURGERY | Age: 69
End: 2024-10-09

## 2024-10-09 ENCOUNTER — TELEPHONE (OUTPATIENT)
Dept: SLEEP CENTER | Age: 69
End: 2024-10-09

## 2024-10-09 VITALS — TEMPERATURE: 97.2 F

## 2024-10-09 DIAGNOSIS — N62 MACROMASTIA: ICD-10-CM

## 2024-10-09 DIAGNOSIS — Z85.3 PERSONAL HISTORY OF BREAST CANCER: Primary | ICD-10-CM

## 2024-10-09 PROCEDURE — 99024 POSTOP FOLLOW-UP VISIT: CPT | Performed by: PHYSICIAN ASSISTANT

## 2024-10-09 NOTE — TELEPHONE ENCOUNTER
----- Message from Dr. Sarabjit Mcdonald MD sent at 10/7/2024 12:35 PM EDT -----  CPAP ordered - please make an appointment for follow-up in 3 months.

## 2024-10-09 NOTE — TELEPHONE ENCOUNTER
Pt returned call discussed SS results and tx recommendations for pap therapy. Pt agreeable. Also discussed compliance, mask exchange and f/u appt. Preferred DME:[facility]

## 2024-10-09 NOTE — TELEPHONE ENCOUNTER
Call to patient to discuss sleeps study results and tx recommendations for a CPAP. LVM to call us back.

## 2024-10-09 NOTE — PROGRESS NOTES
Subjective:    Follow up today from bilateral breast reduction. Denies fever, nausea, vomiting, leg pain or swelling, pain is mild to moderate.  She states she is taking her ABX as directed as well as analgesia PRN.  She is wearing her surgical bra as directed.  She voices no complaints.     Objective:    Temp 97.2 °F (36.2 °C) (Infrared)         Right Breast Wound: Clean, dry, and intact, no drainage.   NAC with capillary refill intact. Appropriate level of edema and ecchymosis noted.  flaps viable with good capillary refill at the area of trifurcation.  Steri strips intact      Assessment:    Patient Active Problem List   Diagnosis    Splenic artery aneurysm (HCC)    Stage 1 breast cancer, ER+, left (HCC)    Near syncope    PAF (paroxysmal atrial fibrillation) (HCC)    Essential hypertension    Nonrheumatic aortic valve insufficiency    Other congenital anomalies of pulmonary valve    Hyperlipidemia    Graves' eye disease    Vitamin D deficiency    Alopecia areata    Synovitis of right foot    Greater trochanteric bursitis of both hips    Spinal stenosis, lumbar region, without neurogenic claudication    Obesity, Class I, BMI 30-34.9    Pulmonary hypertension (HCC)    Moderate tricuspid regurgitation    Personal history of breast cancer       Pathology report- Path Number: [id number]     -- Diagnosis --   Breast, right, reduction:   Unremarkable skin and benign breast tissue with fibrocystic change and   focal apocrine metaplasia.   Negative for malignancy.     Plan:     Status post bilateral breast reduction    Continue with analgesia PRN   ABX until completed  Surgical Bra at all times with padding PRN for comfort  No driving while taking narcotic pain medication or while UE ROM is limited.  OK to begin B/L UE ROM exercises  OK to shower at this time.  Advised the patient that they can allow soap and water to rinse of the incision site while showering.  Once they are done in the shower they are to pat dry the

## 2024-10-11 ENCOUNTER — HOSPITAL ENCOUNTER (OUTPATIENT)
Dept: ULTRASOUND IMAGING | Age: 69
Discharge: HOME | End: 2024-10-11
Payer: MEDICARE

## 2024-10-11 DIAGNOSIS — R14.0 ABDOMINAL BLOATING: ICD-10-CM

## 2024-10-11 PROCEDURE — 76856 US EXAM PELVIC COMPLETE: CPT

## 2024-10-21 ENCOUNTER — OFFICE VISIT (OUTPATIENT)
Dept: SURGERY | Age: 69
End: 2024-10-21

## 2024-10-21 VITALS — TEMPERATURE: 96.4 F

## 2024-10-21 DIAGNOSIS — N62 MACROMASTIA: Primary | ICD-10-CM

## 2024-10-21 PROCEDURE — 99024 POSTOP FOLLOW-UP VISIT: CPT | Performed by: PHYSICIAN ASSISTANT

## 2024-10-21 NOTE — PROGRESS NOTES
Subjective:    Follow up today from right breast reduction. Denies fever, nausea, vomiting, leg pain or swelling, pain isabsent.  She states she is taking her ABX as directed as well as analgesia PRN.  She is wearing her surgical bra as directed.  She voices no complaints.     Objective:    Temp (!) 96.4 °F (35.8 °C) (Temporal)         Right Breast Wound: Clean, dry, and intact, no drainage.   NAC with capillary refill intact. Appropriate level of edema and ecchymosis noted.  flaps viable with good capillary refill at the area of trifurcation.  Steri strips intact      Assessment:    Patient Active Problem List   Diagnosis    Splenic artery aneurysm (HCC)    Stage 1 breast cancer, ER+, left (HCC)    Near syncope    PAF (paroxysmal atrial fibrillation) (HCC)    Essential hypertension    Nonrheumatic aortic valve insufficiency    Other congenital anomalies of pulmonary valve    Hyperlipidemia    Graves' eye disease    Vitamin D deficiency    Alopecia areata    Synovitis of right foot    Greater trochanteric bursitis of both hips    Spinal stenosis, lumbar region, without neurogenic claudication    Obesity, Class I, BMI 30-34.9    Pulmonary hypertension (HCC)    Moderate tricuspid regurgitation    Personal history of breast cancer       Pathology report- Path Number: [id number]     -- Diagnosis --   Breast, right, reduction:   Unremarkable skin and benign breast tissue with fibrocystic change and   focal apocrine metaplasia.   Negative for malignancy.     Plan:     Status post right breast reduction      I informed the patient today that they can continue with a sports bra at this time until the 3-month postop eliazar where they can plan for fitting of a new bra.  I explained to the patient that although their breasts have settled there may be continued swelling for several months.  The patient may not see their final result of their breasts until upwards of 1 year postop.  It is okay for the patient to continue with

## 2024-11-05 ENCOUNTER — TELEPHONE (OUTPATIENT)
Dept: PRIMARY CARE CLINIC | Age: 69
End: 2024-11-05

## 2024-11-05 DIAGNOSIS — E55.9 VITAMIN D INSUFFICIENCY: ICD-10-CM

## 2024-11-05 DIAGNOSIS — E78.5 HYPERLIPIDEMIA, UNSPECIFIED HYPERLIPIDEMIA TYPE: Primary | ICD-10-CM

## 2024-11-05 NOTE — TELEPHONE ENCOUNTER
Patient calling in asking if the Lipid Order can be cancelled?  States that she will have it done in May through [facility]

## 2024-11-05 NOTE — TELEPHONE ENCOUNTER
Patient calling back, states she spoke to   States Dr. Sotelo let him know she was to have it every 6 months.  Patient states she will go have it soon, as she was last drawn in May

## 2024-11-10 ENCOUNTER — HOSPITAL ENCOUNTER (EMERGENCY)
Age: 69
Discharge: HOME | End: 2024-11-10
Payer: MEDICARE

## 2024-11-10 VITALS
BODY MASS INDEX: 30.82 KG/M2 | RESPIRATION RATE: 18 BRPM | SYSTOLIC BLOOD PRESSURE: 146 MMHG | TEMPERATURE: 98.1 F | WEIGHT: 174 LBS | DIASTOLIC BLOOD PRESSURE: 51 MMHG | HEART RATE: 64 BPM | OXYGEN SATURATION: 100 %

## 2024-11-10 DIAGNOSIS — N64.4 BREAST PAIN, LEFT: Primary | ICD-10-CM

## 2024-11-10 DIAGNOSIS — L03.313 CELLULITIS OF CHEST WALL: ICD-10-CM

## 2024-11-10 PROCEDURE — 99211 OFF/OP EST MAY X REQ PHY/QHP: CPT

## 2024-11-10 RX ORDER — CEPHALEXIN 500 MG/1
500 CAPSULE ORAL 4 TIMES DAILY
Qty: 28 CAPSULE | Refills: 0 | Status: SHIPPED | OUTPATIENT
Start: 2024-11-10 | End: 2024-11-17

## 2024-11-10 ASSESSMENT — PAIN - FUNCTIONAL ASSESSMENT: PAIN_FUNCTIONAL_ASSESSMENT: 0-10

## 2024-11-10 ASSESSMENT — PAIN DESCRIPTION - LOCATION: LOCATION: BREAST

## 2024-11-10 ASSESSMENT — PAIN SCALES - GENERAL: PAINLEVEL_OUTOF10: 5

## 2024-11-10 ASSESSMENT — PAIN DESCRIPTION - DESCRIPTORS: DESCRIPTORS: ACHING;OTHER (COMMENT)

## 2024-11-10 ASSESSMENT — PAIN DESCRIPTION - ORIENTATION: ORIENTATION: LEFT

## 2024-11-10 ASSESSMENT — PAIN DESCRIPTION - PAIN TYPE: TYPE: ACUTE PAIN

## 2024-11-10 NOTE — ED PROVIDER NOTES
Independent LICO Visit.    HPI:  11/10/24,   Time: 9:05 AM ALICIA Torres is a 69 y.o. female presenting to the ED for breast pain, redness and warmth.  She has also noticed a lump.  She notices over the past couple days.  It is her left breast in the upper inner quadrant.  She does have a history of breast cancer about 15 years ago.  Last month she had a breast reduction surgery bilaterally.  Last week she was carrying a ladder when it accidentally hit her in the chest and she thinks that might be the cause.  No fevers at home.  Painful to touch but no fluctuance.  No chest pain or shortness of breath.  No drainage.    ROS:   Pertinent positives and negatives are stated within HPI, all other systems reviewed and are negative.  --------------------------------------------- PAST HISTORY ---------------------------------------------  Past Medical History:  has a past medical history of Alopecia areata, Aneurysm of splenic artery (HCC), Atrial fibrillation (HCC), BRCA1 negative, BRCA2 negative, Breast cancer (HCC), Cancer (HCC), Graves' eye disease, History of tachycardia, History of therapeutic radiation, Hx antineoplastic chemo, Hyperlipidemia, Hypertension, Obesity, Class I, BMI 30-34.9, Other congenital anomalies of pulmonary valve, Splenic artery aneurysm (HCC), Vertigo, and Vitamin D deficiency.    Past Surgical History:  has a past surgical history that includes Hysterectomy, total abdominal (N/A); Atrial ablation surgery; Breast surgery (Left, 2009); eye surgery (Left); Breast biopsy (Left); Breast lumpectomy (Left); and Breast reduction surgery (Right, 10/1/2024).    Social History:  reports that she has never smoked. She has never used smokeless tobacco. She reports that she does not drink alcohol and does not use drugs.    Family History: family history includes Breast Cancer in her mother; Breast Cancer (age of onset: 45) in her sister; Cancer in her sister; Heart Disease in her mother; High

## 2024-11-14 ENCOUNTER — OFFICE VISIT (OUTPATIENT)
Dept: PRIMARY CARE CLINIC | Age: 69
End: 2024-11-14
Payer: MEDICARE

## 2024-11-14 VITALS
WEIGHT: 179 LBS | BODY MASS INDEX: 31.71 KG/M2 | SYSTOLIC BLOOD PRESSURE: 102 MMHG | TEMPERATURE: 98.2 F | OXYGEN SATURATION: 99 % | HEART RATE: 63 BPM | DIASTOLIC BLOOD PRESSURE: 72 MMHG | HEIGHT: 63 IN

## 2024-11-14 DIAGNOSIS — Z17.0 STAGE 1 BREAST CANCER, ER+, LEFT (HCC): ICD-10-CM

## 2024-11-14 DIAGNOSIS — C50.912 STAGE 1 BREAST CANCER, ER+, LEFT (HCC): ICD-10-CM

## 2024-11-14 DIAGNOSIS — N64.9 BREAST LESION: Primary | ICD-10-CM

## 2024-11-14 PROCEDURE — 1123F ACP DISCUSS/DSCN MKR DOCD: CPT | Performed by: FAMILY MEDICINE

## 2024-11-14 PROCEDURE — 1160F RVW MEDS BY RX/DR IN RCRD: CPT | Performed by: FAMILY MEDICINE

## 2024-11-14 PROCEDURE — 99213 OFFICE O/P EST LOW 20 MIN: CPT | Performed by: FAMILY MEDICINE

## 2024-11-14 PROCEDURE — 1090F PRES/ABSN URINE INCON ASSESS: CPT | Performed by: FAMILY MEDICINE

## 2024-11-14 PROCEDURE — G8417 CALC BMI ABV UP PARAM F/U: HCPCS | Performed by: FAMILY MEDICINE

## 2024-11-14 PROCEDURE — 1036F TOBACCO NON-USER: CPT | Performed by: FAMILY MEDICINE

## 2024-11-14 PROCEDURE — 3017F COLORECTAL CA SCREEN DOC REV: CPT | Performed by: FAMILY MEDICINE

## 2024-11-14 PROCEDURE — G8399 PT W/DXA RESULTS DOCUMENT: HCPCS | Performed by: FAMILY MEDICINE

## 2024-11-14 PROCEDURE — G8482 FLU IMMUNIZE ORDER/ADMIN: HCPCS | Performed by: FAMILY MEDICINE

## 2024-11-14 PROCEDURE — 3078F DIAST BP <80 MM HG: CPT | Performed by: FAMILY MEDICINE

## 2024-11-14 PROCEDURE — G8427 DOCREV CUR MEDS BY ELIG CLIN: HCPCS | Performed by: FAMILY MEDICINE

## 2024-11-14 PROCEDURE — 1159F MED LIST DOCD IN RCRD: CPT | Performed by: FAMILY MEDICINE

## 2024-11-14 PROCEDURE — 3074F SYST BP LT 130 MM HG: CPT | Performed by: FAMILY MEDICINE

## 2024-11-14 RX ORDER — DOXYCYCLINE HYCLATE 100 MG
100 TABLET ORAL 2 TIMES DAILY
Qty: 20 TABLET | Refills: 0 | Status: SHIPPED | OUTPATIENT
Start: 2024-11-14 | End: 2024-11-24

## 2024-11-14 NOTE — PROGRESS NOTES
08/05/2024 58 43.0 - 80.0 % Final     Immature Granulocytes #   Date Value Ref Range Status   07/29/2022 0.04 E9/L Final     Immature Granulocytes %   Date Value Ref Range Status   08/05/2024 0 0.0 - 5.0 % Final   07/29/2022 0.7 0.0 - 5.0 % Final     Lymphocytes %   Date Value Ref Range Status   08/05/2024 27 20.0 - 42.0 % Final     Monocytes %   Date Value Ref Range Status   08/05/2024 9 2.0 - 12.0 % Final     Basophils %   Date Value Ref Range Status   08/05/2024 1 0.0 - 2.0 % Final     Neutrophils Absolute   Date Value Ref Range Status   08/05/2024 3.12 1.80 - 7.30 k/uL Final     Lymphocytes Absolute   Date Value Ref Range Status   08/05/2024 1.43 (L) 1.50 - 4.00 k/uL Final     Monocytes Absolute   Date Value Ref Range Status   08/05/2024 0.48 0.10 - 0.95 k/uL Final     Eosinophils Absolute   Date Value Ref Range Status   08/05/2024 0.25 0.05 - 0.50 k/uL Final     Basophils Absolute   Date Value Ref Range Status   08/05/2024 0.06 0.00 - 0.20 k/uL Final       CMP  Sodium   Date Value Ref Range Status   10/01/2024 140 132 - 146 mmol/L Final     Potassium   Date Value Ref Range Status   10/01/2024 4.4 3.5 - 5.0 mmol/L Final     Chloride   Date Value Ref Range Status   10/01/2024 105 98 - 107 mmol/L Final     CO2   Date Value Ref Range Status   10/01/2024 24 22 - 29 mmol/L Final     Anion Gap   Date Value Ref Range Status   10/01/2024 11 7 - 16 mmol/L Final     Glucose   Date Value Ref Range Status   10/01/2024 108 (H) 74 - 99 mg/dL Final   04/09/2012 109 70 - 110 mg/dL Final     BUN   Date Value Ref Range Status   10/01/2024 18 6 - 23 mg/dL Final     Creatinine   Date Value Ref Range Status   10/01/2024 1.0 0.50 - 1.00 mg/dL Final     Est, Glom Filt Rate   Date Value Ref Range Status   10/01/2024 62 >60 mL/min/1.73m2 Final     Comment:           These results are not intended for use in patients <18 years of age.        eGFR results are calculated without a race factor using the 2021 CKD-EPI equation.  Careful

## 2024-11-18 ENCOUNTER — TELEPHONE (OUTPATIENT)
Dept: BREAST CENTER | Age: 69
End: 2024-11-18

## 2024-11-18 ASSESSMENT — ENCOUNTER SYMPTOMS
GASTROINTESTINAL NEGATIVE: 1
APNEA: 1

## 2024-11-18 NOTE — TELEPHONE ENCOUNTER
Patient is a new referral to the breast clinic. Called and spoke with patient. She states she recently had a right breast reduction completed which is healing well. She has a history left breast cancer 15 years ago and had a lumpectomy, radiation and chemo. She states she recently noticed the breast was reddened and very tender to touch. She is unsure if a ladder had hit it or not. She did go to urgent care and has been on antibiotics with no change. She saw her PCP on 11/14 and he changed the antibiotics. She continues to wear a sports bra and does warm compresses. She cannot tolerate any imaging at this time. Appointment given to see Dr solomon on 11/26/24 at 230 pm. Patient will call if the area heals.    Electronically signed by Nayeli Riddle RN on 11/18/24 at 12:44 PM EST

## 2024-11-25 ENCOUNTER — HOSPITAL ENCOUNTER (OUTPATIENT)
Age: 69
Discharge: HOME | End: 2024-11-25
Payer: MEDICARE

## 2024-11-25 DIAGNOSIS — E78.5 HYPERLIPIDEMIA, UNSPECIFIED HYPERLIPIDEMIA TYPE: ICD-10-CM

## 2024-11-25 DIAGNOSIS — E55.9 VITAMIN D INSUFFICIENCY: ICD-10-CM

## 2024-11-25 LAB
25(OH)D3 SERPL-MCNC: 42 NG/ML (ref 30–100)
CHOLEST SERPL-MCNC: 247 MG/DL
HDLC SERPL-MCNC: 79 MG/DL
LDLC SERPL CALC-MCNC: 155 MG/DL
TRIGL SERPL-MCNC: 64 MG/DL
VLDLC SERPL CALC-MCNC: 13 MG/DL

## 2024-11-25 PROCEDURE — 80061 LIPID PANEL: CPT

## 2024-11-25 PROCEDURE — 36415 COLL VENOUS BLD VENIPUNCTURE: CPT

## 2024-11-25 PROCEDURE — 82306 VITAMIN D 25 HYDROXY: CPT

## 2024-11-26 ENCOUNTER — OFFICE VISIT (OUTPATIENT)
Dept: BREAST CENTER | Age: 69
End: 2024-11-26
Payer: MEDICARE

## 2024-11-26 VITALS
HEIGHT: 63 IN | WEIGHT: 176 LBS | DIASTOLIC BLOOD PRESSURE: 62 MMHG | OXYGEN SATURATION: 98 % | SYSTOLIC BLOOD PRESSURE: 112 MMHG | HEART RATE: 63 BPM | TEMPERATURE: 97.7 F | BODY MASS INDEX: 31.18 KG/M2 | RESPIRATION RATE: 16 BRPM

## 2024-11-26 DIAGNOSIS — Z85.3 PERSONAL HISTORY OF BREAST CANCER: Primary | ICD-10-CM

## 2024-11-26 PROCEDURE — 3017F COLORECTAL CA SCREEN DOC REV: CPT | Performed by: SURGERY

## 2024-11-26 PROCEDURE — 1036F TOBACCO NON-USER: CPT | Performed by: SURGERY

## 2024-11-26 PROCEDURE — 1123F ACP DISCUSS/DSCN MKR DOCD: CPT | Performed by: SURGERY

## 2024-11-26 PROCEDURE — 1159F MED LIST DOCD IN RCRD: CPT | Performed by: SURGERY

## 2024-11-26 PROCEDURE — 3074F SYST BP LT 130 MM HG: CPT | Performed by: SURGERY

## 2024-11-26 PROCEDURE — 3078F DIAST BP <80 MM HG: CPT | Performed by: SURGERY

## 2024-11-26 PROCEDURE — G8417 CALC BMI ABV UP PARAM F/U: HCPCS | Performed by: SURGERY

## 2024-11-26 PROCEDURE — 99203 OFFICE O/P NEW LOW 30 MIN: CPT | Performed by: SURGERY

## 2024-11-26 PROCEDURE — G8482 FLU IMMUNIZE ORDER/ADMIN: HCPCS | Performed by: SURGERY

## 2024-11-26 PROCEDURE — G8399 PT W/DXA RESULTS DOCUMENT: HCPCS | Performed by: SURGERY

## 2024-11-26 PROCEDURE — 1090F PRES/ABSN URINE INCON ASSESS: CPT | Performed by: SURGERY

## 2024-11-26 PROCEDURE — G8427 DOCREV CUR MEDS BY ELIG CLIN: HCPCS | Performed by: SURGERY

## 2024-11-26 NOTE — PROGRESS NOTES
Date of Visit: 11/26/2024  New Patient    07/01/11: NG  11/26/24:    DIAGNOSIS:  1. (11/26/24) LEFT breast abnormal SBE  * Lump, redness, warmth  * ED (11/10/24) keflex  * PCP (11/18/24) doxycycline  2. (2009) LEFT breast cancer  ER (+) [place] (+) HER2(-)  [id number]  3. Family history of breast cancer  * Mother  * Sister/45  4. Patient BRCA testing negative    TREATMENT  1. (11/11/09) LEFT lumpectomy and ALND  2. Adjuvant TC  3. Adjuvant RT  4. Arimidex  2. (10/01/24) RIGHT reduction mammoplasty    IMAGING/PROCEDURES:  1. (01/09/24) BILATERAL st-mammogram: BIRADS-2  *dk - LEFT scar/fat necrosis at surgical site  * noted by FRANKY on exam previously      HISTORY OF PRESENT ILLNESS  Paige Torres was in the office today for her consultation regarding irregularities of the left breast.    Paige it should be noted was treated for left breast cancer in 2009.  At that time she underwent breast conserving therapy followed by adjuvant chemotherapy, radiotherapy and hormonal therapy.    More recently the patient underwent a right breast reduction for symmetry.    Earlier this month the patient noted erythema and tenderness in the left periareolar location near her prior lumpectomy incision.  She reports an incident where a ladder fell and tore although she is unsure as to whether or not it impacted her breast.  The patient self treated with ice and Tylenol and noted minimal improvement.  She was then seen in urgent care and placed on Keflex for a possible mastitis.  She then followed up with her PCP and was placed on doxycycline which she recently completed.  The patient does not believe that the antibiotic therapy improved her condition.    She is in the office now to discuss the above and receive any additional recommendations.    BREAST SYMPTOMS  Paige again reports erythema and tenderness in the left periareolar location.  She also believes that there is an increase in the palpable scar that that has been present

## 2024-12-18 ENCOUNTER — HOSPITAL ENCOUNTER (OUTPATIENT)
Dept: GENERAL RADIOLOGY | Age: 69
Discharge: HOME | End: 2024-12-20
Attending: SURGERY
Payer: MEDICARE

## 2024-12-18 DIAGNOSIS — Z85.3 PERSONAL HISTORY OF BREAST CANCER: ICD-10-CM

## 2024-12-18 PROCEDURE — G0279 TOMOSYNTHESIS, MAMMO: HCPCS

## 2024-12-18 PROCEDURE — 76642 ULTRASOUND BREAST LIMITED: CPT

## 2024-12-19 DIAGNOSIS — N63.42 UNSPECIFIED LUMP IN LEFT BREAST, SUBAREOLAR: ICD-10-CM

## 2024-12-19 DIAGNOSIS — Z12.31 ENCOUNTER FOR SCREENING MAMMOGRAM FOR MALIGNANT NEOPLASM OF BREAST: ICD-10-CM

## 2024-12-19 DIAGNOSIS — N63.0 BREAST MASS IN FEMALE: Primary | ICD-10-CM

## 2024-12-19 NOTE — TELEPHONE ENCOUNTER
Requested Prescriptions     Pending Prescriptions Disp Refills    metoprolol succinate (TOPROL XL) 25 MG extended release tablet [Pharmacy Med Name: Metoprolol Succinate ER Oral Tablet Extended Release 24 Hour 25 MG] 90 tablet 0     Sig: TAKE ONE TABLET BY MOUTH EVERY DAY       Next appt is 1/13/2025  Last appt was 11/14/2024

## 2024-12-20 RX ORDER — METOPROLOL SUCCINATE 25 MG/1
TABLET, EXTENDED RELEASE ORAL
Qty: 90 TABLET | Refills: 3 | Status: SHIPPED | OUTPATIENT
Start: 2024-12-20

## 2025-01-09 ENCOUNTER — HOSPITAL ENCOUNTER (OUTPATIENT)
Dept: GENERAL RADIOLOGY | Age: 70
Discharge: HOME | End: 2025-01-11
Attending: SURGERY
Payer: MEDICARE

## 2025-01-09 DIAGNOSIS — N63.42 UNSPECIFIED LUMP IN LEFT BREAST, SUBAREOLAR: ICD-10-CM

## 2025-01-09 DIAGNOSIS — N63.0 BREAST MASS IN FEMALE: ICD-10-CM

## 2025-01-09 PROCEDURE — 2709999900 US BREAST BIOPSY W LOC DEVICE 1ST LESION LEFT

## 2025-01-09 PROCEDURE — 77065 DX MAMMO INCL CAD UNI: CPT

## 2025-01-09 NOTE — PROGRESS NOTES
Met with patient prior to her breast biopsy. Instructed on ultrasound guided  breast biopsy procedure. Instructed that results will be available in approximately 3-5 business days. She confirms that she has an active [facility] My[facility] account. Instructed that Dr. Wells will call her when results are available.  Provided with folder containing my contact information and post biopsy discharge instructions. Instructed to call me if she has any questions or concerns about her biopsy. Verbalizes understanding.  Electronically signed by Grecia Wilson RN, BSN on 1/9/2025 at 8:35 AM

## 2025-01-10 SDOH — ECONOMIC STABILITY: INCOME INSECURITY: IN THE LAST 12 MONTHS, WAS THERE A TIME WHEN YOU WERE NOT ABLE TO PAY THE MORTGAGE OR RENT ON TIME?: NO

## 2025-01-10 SDOH — ECONOMIC STABILITY: FOOD INSECURITY: WITHIN THE PAST 12 MONTHS, THE FOOD YOU BOUGHT JUST DIDN'T LAST AND YOU DIDN'T HAVE MONEY TO GET MORE.: NEVER TRUE

## 2025-01-10 SDOH — ECONOMIC STABILITY: TRANSPORTATION INSECURITY
IN THE PAST 12 MONTHS, HAS THE LACK OF TRANSPORTATION KEPT YOU FROM MEDICAL APPOINTMENTS OR FROM GETTING MEDICATIONS?: NO

## 2025-01-10 SDOH — ECONOMIC STABILITY: FOOD INSECURITY: WITHIN THE PAST 12 MONTHS, YOU WORRIED THAT YOUR FOOD WOULD RUN OUT BEFORE YOU GOT MONEY TO BUY MORE.: NEVER TRUE

## 2025-01-10 SDOH — HEALTH STABILITY: PHYSICAL HEALTH: ON AVERAGE, HOW MANY MINUTES DO YOU ENGAGE IN EXERCISE AT THIS LEVEL?: 40 MIN

## 2025-01-10 SDOH — HEALTH STABILITY: PHYSICAL HEALTH: ON AVERAGE, HOW MANY DAYS PER WEEK DO YOU ENGAGE IN MODERATE TO STRENUOUS EXERCISE (LIKE A BRISK WALK)?: 3 DAYS

## 2025-01-10 ASSESSMENT — PATIENT HEALTH QUESTIONNAIRE - PHQ9
SUM OF ALL RESPONSES TO PHQ QUESTIONS 1-9: 0
SUM OF ALL RESPONSES TO PHQ QUESTIONS 1-9: 0
2. FEELING DOWN, DEPRESSED OR HOPELESS: NOT AT ALL
SUM OF ALL RESPONSES TO PHQ QUESTIONS 1-9: 0
SUM OF ALL RESPONSES TO PHQ QUESTIONS 1-9: 0
1. LITTLE INTEREST OR PLEASURE IN DOING THINGS: NOT AT ALL
SUM OF ALL RESPONSES TO PHQ9 QUESTIONS 1 & 2: 0

## 2025-01-10 ASSESSMENT — LIFESTYLE VARIABLES
HOW MANY STANDARD DRINKS CONTAINING ALCOHOL DO YOU HAVE ON A TYPICAL DAY: 1 OR 2
HOW MANY STANDARD DRINKS CONTAINING ALCOHOL DO YOU HAVE ON A TYPICAL DAY: 1
HOW OFTEN DO YOU HAVE A DRINK CONTAINING ALCOHOL: 2
HOW OFTEN DO YOU HAVE A DRINK CONTAINING ALCOHOL: MONTHLY OR LESS
HOW OFTEN DO YOU HAVE SIX OR MORE DRINKS ON ONE OCCASION: 1

## 2025-01-13 ENCOUNTER — OFFICE VISIT (OUTPATIENT)
Dept: PRIMARY CARE CLINIC | Age: 70
End: 2025-01-13
Payer: MEDICARE

## 2025-01-13 ENCOUNTER — TELEPHONE (OUTPATIENT)
Dept: PRIMARY CARE CLINIC | Age: 70
End: 2025-01-13

## 2025-01-13 VITALS
SYSTOLIC BLOOD PRESSURE: 112 MMHG | HEIGHT: 63 IN | WEIGHT: 173 LBS | HEART RATE: 60 BPM | DIASTOLIC BLOOD PRESSURE: 84 MMHG | OXYGEN SATURATION: 100 % | BODY MASS INDEX: 30.65 KG/M2 | TEMPERATURE: 97.7 F

## 2025-01-13 DIAGNOSIS — Z00.00 MEDICARE ANNUAL WELLNESS VISIT, SUBSEQUENT: Primary | ICD-10-CM

## 2025-01-13 PROCEDURE — G0439 PPPS, SUBSEQ VISIT: HCPCS | Performed by: FAMILY MEDICINE

## 2025-01-13 PROCEDURE — 3017F COLORECTAL CA SCREEN DOC REV: CPT | Performed by: FAMILY MEDICINE

## 2025-01-13 PROCEDURE — 3074F SYST BP LT 130 MM HG: CPT | Performed by: FAMILY MEDICINE

## 2025-01-13 PROCEDURE — 1123F ACP DISCUSS/DSCN MKR DOCD: CPT | Performed by: FAMILY MEDICINE

## 2025-01-13 PROCEDURE — 3079F DIAST BP 80-89 MM HG: CPT | Performed by: FAMILY MEDICINE

## 2025-01-13 PROCEDURE — 1160F RVW MEDS BY RX/DR IN RCRD: CPT | Performed by: FAMILY MEDICINE

## 2025-01-13 PROCEDURE — 1159F MED LIST DOCD IN RCRD: CPT | Performed by: FAMILY MEDICINE

## 2025-01-13 RX ORDER — BETAMETHASONE DIPROPIONATE 0.5 MG/G
CREAM TOPICAL
Qty: 50 G | Refills: 0 | Status: SHIPPED | OUTPATIENT
Start: 2025-01-13 | End: 2025-02-12

## 2025-01-13 NOTE — PROGRESS NOTES
Medicare Annual Wellness Visit    Paige Torres is here for Medicare AWV (Medicare AWE Subsequent Labs obtained 11/25/24. Pt is currently awaiting biopsy results L breast)    Assessment & Plan   Medicare annual wellness visit, subsequent     Return in 6 months (on 7/13/2025) for Medicare Annual Wellness Visit in 1 year, Follow up.     Subjective       Patient's complete Health Risk Assessment and screening values have been reviewed and are found in Flowsheets. The following problems were reviewed today and where indicated follow up appointments were made and/or referrals ordered.    Positive Risk Factor Screenings with Interventions:                Abnormal BMI (obese):  Body mass index is 30.65 kg/m². (!) Abnormal  Interventions:  low carbohydrate diet, exercise for at least 150 minutes/week          Vision Screen:  Do you have difficulty driving, watching TV, or doing any of your daily activities because of your eyesight?: No  Have you had an eye exam within the past year?: (!) No  Interventions:   Patient encouraged to make appointment with their eye specialist    Safety:  Do you have non-slip mats or non-slip surfaces or shower bars or grab bars in your shower or bathtub?: (!) No  Interventions:  Patient declined any further interventions or treatment     Advanced Directives:  Do you have a Living Will?: (!) No    Intervention:  Patient will consider in the future.                     Objective   Vitals:    01/13/25 0855   BP: 112/84   Position: Sitting   Pulse: 60   Temp: 97.7 °F (36.5 °C)   TempSrc: Temporal   SpO2: 100%   Weight: 78.5 kg (173 lb)   Height: 1.6 m (5' 3\")      Body mass index is 30.65 kg/m².                    Allergies   Allergen Reactions    Latex Hives and Itching    Ezetimibe Myalgia    Flecainide Other (See Comments)     Numbness and tingling in fingers    Fluconazole In Dextrose      Not to take with rythmol   Almost passed out rapid heart beat    Other Other (See Comments)

## 2025-01-13 NOTE — PATIENT INSTRUCTIONS
start a weight-loss plan?  Set realistic goals. Many people expect to lose much more weight than is likely. A weight loss of 5% to 10% of your body weight may be enough to improve your health.  Get family and friends involved to provide support. Talk to them about why you are trying to lose weight, and ask them to help. They can help by participating in exercise and having meals with you, even if they may be eating something different.  Find what works best for you. If you do not have time or do not like to cook, a program that offers meal replacement bars or shakes may be better for you. Or if you like to prepare meals, finding a plan that includes daily menus and recipes may be best.  Ask your doctor about other health professionals who can help you achieve your weight-loss goals.  A dietitian can help you make healthy changes in your diet.  An exercise specialist or  can help you develop a safe and effective exercise program.  A counselor or psychiatrist can help you cope with issues such as depression, anxiety, or family problems that can make it hard to focus on weight loss.  Consider joining a support group for people who are trying to lose weight. Your doctor can suggest groups in your area.  Where can you learn more?  Go to https://www.[employer].net/patientEd and enter U357 to learn more about \"Starting a Weight-Loss Plan: Care Instructions.\"  Current as of: April 30, 2024  Content Version: 14.3  © 2024 [employer].   Care instructions adapted under license by [employer]. If you have questions about a medical condition or this instruction, always ask your healthcare professional. [employer] disclaims any warranty or liability for your use of this information.         Advance Directives: Care Instructions  Overview  An advance directive is a legal way to state your wishes at the end of your life. It tells your family and your doctor what to do if you can't say what

## 2025-01-13 NOTE — TELEPHONE ENCOUNTER
Pt stopped back in office to informed provider she gave the wrong name of the cream she is currently using.    It is Betamethasone Dipropionate Cream (Augmented) 0.05%

## 2025-01-14 LAB — SURGICAL PATHOLOGY REPORT: NORMAL

## 2025-01-15 ENCOUNTER — TELEPHONE (OUTPATIENT)
Dept: BREAST CENTER | Age: 70
End: 2025-01-15

## 2025-01-15 ENCOUNTER — OFFICE VISIT (OUTPATIENT)
Dept: SLEEP CENTER | Age: 70
End: 2025-01-15
Payer: MEDICARE

## 2025-01-15 VITALS
HEIGHT: 63 IN | WEIGHT: 177.69 LBS | TEMPERATURE: 97.7 F | HEART RATE: 56 BPM | SYSTOLIC BLOOD PRESSURE: 144 MMHG | RESPIRATION RATE: 14 BRPM | DIASTOLIC BLOOD PRESSURE: 79 MMHG | OXYGEN SATURATION: 97 % | BODY MASS INDEX: 31.48 KG/M2

## 2025-01-15 DIAGNOSIS — Z12.31 ENCOUNTER FOR SCREENING MAMMOGRAM FOR MALIGNANT NEOPLASM OF BREAST: ICD-10-CM

## 2025-01-15 DIAGNOSIS — G47.33 OSA (OBSTRUCTIVE SLEEP APNEA): Primary | ICD-10-CM

## 2025-01-15 DIAGNOSIS — Z85.3 PERSONAL HISTORY OF BREAST CANCER: Primary | ICD-10-CM

## 2025-01-15 DIAGNOSIS — E66.9 OBESITY (BMI 30-39.9): ICD-10-CM

## 2025-01-15 PROCEDURE — 1123F ACP DISCUSS/DSCN MKR DOCD: CPT | Performed by: STUDENT IN AN ORGANIZED HEALTH CARE EDUCATION/TRAINING PROGRAM

## 2025-01-15 PROCEDURE — G8399 PT W/DXA RESULTS DOCUMENT: HCPCS | Performed by: STUDENT IN AN ORGANIZED HEALTH CARE EDUCATION/TRAINING PROGRAM

## 2025-01-15 PROCEDURE — 3078F DIAST BP <80 MM HG: CPT | Performed by: STUDENT IN AN ORGANIZED HEALTH CARE EDUCATION/TRAINING PROGRAM

## 2025-01-15 PROCEDURE — G8417 CALC BMI ABV UP PARAM F/U: HCPCS | Performed by: STUDENT IN AN ORGANIZED HEALTH CARE EDUCATION/TRAINING PROGRAM

## 2025-01-15 PROCEDURE — 99214 OFFICE O/P EST MOD 30 MIN: CPT | Performed by: STUDENT IN AN ORGANIZED HEALTH CARE EDUCATION/TRAINING PROGRAM

## 2025-01-15 PROCEDURE — 1090F PRES/ABSN URINE INCON ASSESS: CPT | Performed by: STUDENT IN AN ORGANIZED HEALTH CARE EDUCATION/TRAINING PROGRAM

## 2025-01-15 PROCEDURE — 3017F COLORECTAL CA SCREEN DOC REV: CPT | Performed by: STUDENT IN AN ORGANIZED HEALTH CARE EDUCATION/TRAINING PROGRAM

## 2025-01-15 PROCEDURE — 1159F MED LIST DOCD IN RCRD: CPT | Performed by: STUDENT IN AN ORGANIZED HEALTH CARE EDUCATION/TRAINING PROGRAM

## 2025-01-15 PROCEDURE — 1036F TOBACCO NON-USER: CPT | Performed by: STUDENT IN AN ORGANIZED HEALTH CARE EDUCATION/TRAINING PROGRAM

## 2025-01-15 PROCEDURE — 3077F SYST BP >= 140 MM HG: CPT | Performed by: STUDENT IN AN ORGANIZED HEALTH CARE EDUCATION/TRAINING PROGRAM

## 2025-01-15 PROCEDURE — G8427 DOCREV CUR MEDS BY ELIG CLIN: HCPCS | Performed by: STUDENT IN AN ORGANIZED HEALTH CARE EDUCATION/TRAINING PROGRAM

## 2025-01-15 ASSESSMENT — SLEEP AND FATIGUE QUESTIONNAIRES
ESS TOTAL SCORE: 6
HOW LIKELY ARE YOU TO NOD OFF OR FALL ASLEEP IN A CAR, WHILE STOPPED FOR A FEW MINUTES IN TRAFFIC: WOULD NEVER DOZE
HOW LIKELY ARE YOU TO NOD OFF OR FALL ASLEEP WHILE WATCHING TV: SLIGHT CHANCE OF DOZING
HOW LIKELY ARE YOU TO NOD OFF OR FALL ASLEEP WHEN YOU ARE A PASSENGER IN A CAR FOR AN HOUR WITHOUT A BREAK: MODERATE CHANCE OF DOZING
HOW LIKELY ARE YOU TO NOD OFF OR FALL ASLEEP WHILE SITTING QUIETLY AFTER LUNCH WITHOUT ALCOHOL: WOULD NEVER DOZE
HOW LIKELY ARE YOU TO NOD OFF OR FALL ASLEEP WHILE SITTING AND READING: SLIGHT CHANCE OF DOZING
HOW LIKELY ARE YOU TO NOD OFF OR FALL ASLEEP WHILE SITTING AND TALKING TO SOMEONE: WOULD NEVER DOZE
HOW LIKELY ARE YOU TO NOD OFF OR FALL ASLEEP WHILE LYING DOWN TO REST IN THE AFTERNOON WHEN CIRCUMSTANCES PERMIT: MODERATE CHANCE OF DOZING
HOW LIKELY ARE YOU TO NOD OFF OR FALL ASLEEP WHILE SITTING INACTIVE IN A PUBLIC PLACE: WOULD NEVER DOZE

## 2025-01-15 NOTE — PROGRESS NOTES
recognition software. All grammatical or semantic errors should be considered accordingly.    Level of Service:  Notes Reviewed: 3+ (PCP)  Labs Reviewed: 3+ (CBC, CMP, TSH)    Sarabjit Mcdonald M.D.  [facility] Sleep Medicine  Diplomate, American Board of Internal Medicine - Sleep Medicine  Diplomate, American Board of Internal Medicine

## 2025-01-15 NOTE — TELEPHONE ENCOUNTER
Called patient and discussed LEFT benign biopsy    Office visit 3 months (April) and same day RIGHT screen mammo

## 2025-01-16 NOTE — TELEPHONE ENCOUNTER
Patient is scheduled for 4/22/2025 for office and mammogram.       Electronically signed by Leticia Fernandez RN on 1/16/25 at 8:34 AM EST

## 2025-04-22 ENCOUNTER — HOSPITAL ENCOUNTER (OUTPATIENT)
Dept: GENERAL RADIOLOGY | Age: 70
Discharge: HOME | End: 2025-04-24
Payer: MEDICARE

## 2025-04-22 ENCOUNTER — OFFICE VISIT (OUTPATIENT)
Dept: BREAST CENTER | Age: 70
End: 2025-04-22
Payer: MEDICARE

## 2025-04-22 VITALS — WEIGHT: 165 LBS | HEIGHT: 63 IN | BODY MASS INDEX: 29.23 KG/M2

## 2025-04-22 VITALS
OXYGEN SATURATION: 96 % | DIASTOLIC BLOOD PRESSURE: 72 MMHG | RESPIRATION RATE: 18 BRPM | HEART RATE: 60 BPM | HEIGHT: 63 IN | WEIGHT: 170 LBS | SYSTOLIC BLOOD PRESSURE: 128 MMHG | TEMPERATURE: 97.9 F | BODY MASS INDEX: 30.12 KG/M2

## 2025-04-22 DIAGNOSIS — Z85.3 PERSONAL HISTORY OF BREAST CANCER: ICD-10-CM

## 2025-04-22 DIAGNOSIS — Z12.31 ENCOUNTER FOR SCREENING MAMMOGRAM FOR MALIGNANT NEOPLASM OF BREAST: Primary | ICD-10-CM

## 2025-04-22 DIAGNOSIS — Z85.3 PERSONAL HISTORY OF BREAST CANCER: Primary | ICD-10-CM

## 2025-04-22 DIAGNOSIS — Z12.31 ENCOUNTER FOR SCREENING MAMMOGRAM FOR MALIGNANT NEOPLASM OF BREAST: ICD-10-CM

## 2025-04-22 PROCEDURE — G8399 PT W/DXA RESULTS DOCUMENT: HCPCS | Performed by: SURGERY

## 2025-04-22 PROCEDURE — G8417 CALC BMI ABV UP PARAM F/U: HCPCS | Performed by: SURGERY

## 2025-04-22 PROCEDURE — 99213 OFFICE O/P EST LOW 20 MIN: CPT | Performed by: SURGERY

## 2025-04-22 PROCEDURE — 1159F MED LIST DOCD IN RCRD: CPT | Performed by: SURGERY

## 2025-04-22 PROCEDURE — 3074F SYST BP LT 130 MM HG: CPT | Performed by: SURGERY

## 2025-04-22 PROCEDURE — G8427 DOCREV CUR MEDS BY ELIG CLIN: HCPCS | Performed by: SURGERY

## 2025-04-22 PROCEDURE — 3017F COLORECTAL CA SCREEN DOC REV: CPT | Performed by: SURGERY

## 2025-04-22 PROCEDURE — 99212 OFFICE O/P EST SF 10 MIN: CPT | Performed by: SURGERY

## 2025-04-22 PROCEDURE — 1123F ACP DISCUSS/DSCN MKR DOCD: CPT | Performed by: SURGERY

## 2025-04-22 PROCEDURE — 1036F TOBACCO NON-USER: CPT | Performed by: SURGERY

## 2025-04-22 PROCEDURE — 1090F PRES/ABSN URINE INCON ASSESS: CPT | Performed by: SURGERY

## 2025-04-22 PROCEDURE — 3078F DIAST BP <80 MM HG: CPT | Performed by: SURGERY

## 2025-04-22 PROCEDURE — 77063 BREAST TOMOSYNTHESIS BI: CPT

## 2025-04-22 NOTE — PROGRESS NOTES
Date of visit: 4/22/2025 07/01/11: NG  11/26/24:  04/22/25:    DIAGNOSIS:  1. (11/26/24) LEFT breast abnormal SBE  * Possible ladder trauma  * Lump, redness, warmth  * ED (11/10/24) keflex  * PCP (11/18/24) doxycycline  * Benign biopsy  2. (2009) LEFT breast cancer  ER (+) [place] (+) HER2(-)  [id number]  3. Family history of breast cancer  * Mother  * Sister/45  4. Patient BRCA testing negative    TREATMENT  1. (11/11/09) LEFT lumpectomy and ALND  2. Adjuvant TC  3. Adjuvant RT  4. Arimidex  2. (10/01/24) RIGHT reduction mammoplasty    IMAGING/PROCEDURES:  1. (01/09/24) BILATERAL st-mammogram: BIRADS-2  *dk - LEFT scar/fat necrosis at surgical site  * noted by FRANKY on exam previously  2. (12/18/24) LEFT dt-mammogram and US: BIRADS-2  3. (01/09/25) LEFT US biopsy  * fat necrosis and inflammation  4. (04/22/25) RIGHT st-mammogram    PREVISIT PLAN:  Check when genetic testing done  Check redness  Check same day RIGHT mammo    Breast History  Paige Torres was in the office for follow-up.    Paige was evaluated November 2020 for for a firm mass and discoloration at her prior site of breast cancer on the left side.  It was somewhat clinically concerning.  She did not undergo biopsy which demonstrated fat necrosis.  She was asked to return today for clinical follow-up.    Breast Symptoms  Paige reports that following the biopsy of the firm mass is no longer present.  She still see some discoloration however it is not as red as before.    Breast Examination  There are no cervical, supraclavicular, or infraclavicular lymph nodes palpable.    Inspection of the breast bilaterally demonstrates there to be the remote incisions of her reduction on the right.  On the left side in the periareolar location from approximately 10:00 to 12:00 the bright erythema has significantly decreased in intensity.    Palpation of the axilla bilaterally is without adenopathy.    Palpation of the right breast demonstrates no focal

## 2025-05-22 RX ORDER — LISINOPRIL 2.5 MG/1
2.5 TABLET ORAL DAILY
Qty: 90 TABLET | Refills: 3 | Status: SHIPPED | OUTPATIENT
Start: 2025-05-22

## 2025-08-12 ENCOUNTER — HOSPITAL ENCOUNTER (OUTPATIENT)
Dept: INFUSION THERAPY | Age: 70
Discharge: HOME | End: 2025-08-12
Payer: MEDICARE

## 2025-08-12 DIAGNOSIS — C50.912 STAGE 1 BREAST CANCER, ER+, LEFT (HCC): Primary | ICD-10-CM

## 2025-08-12 DIAGNOSIS — Z17.0 STAGE 1 BREAST CANCER, ER+, LEFT (HCC): Primary | ICD-10-CM

## 2025-08-12 LAB
ALBUMIN SERPL-MCNC: 4.1 G/DL (ref 3.5–5.2)
ALP SERPL-CCNC: 71 U/L (ref 35–104)
ALT SERPL-CCNC: 19 U/L (ref 0–35)
ANION GAP SERPL CALCULATED.3IONS-SCNC: 10 MMOL/L (ref 7–16)
AST SERPL-CCNC: 21 U/L (ref 0–35)
BASOPHILS # BLD: 0.05 K/UL (ref 0–0.2)
BASOPHILS NFR BLD: 1 % (ref 0–2)
BILIRUB SERPL-MCNC: 0.5 MG/DL (ref 0–1.2)
BUN SERPL-MCNC: 17 MG/DL (ref 8–23)
CALCIUM SERPL-MCNC: 9.1 MG/DL (ref 8.8–10.2)
CHLORIDE SERPL-SCNC: 103 MMOL/L (ref 98–107)
CO2 SERPL-SCNC: 27 MMOL/L (ref 22–29)
CREAT SERPL-MCNC: 0.9 MG/DL (ref 0.5–1)
EOSINOPHIL # BLD: 0.25 K/UL (ref 0.05–0.5)
EOSINOPHILS RELATIVE PERCENT: 4 % (ref 0–6)
ERYTHROCYTE [DISTWIDTH] IN BLOOD BY AUTOMATED COUNT: 12.7 % (ref 11.5–15)
GFR, ESTIMATED: 66 ML/MIN/1.73M2
GLUCOSE SERPL-MCNC: 103 MG/DL (ref 74–99)
HCT VFR BLD AUTO: 38.8 % (ref 34–48)
HGB BLD-MCNC: 12.8 G/DL (ref 11.5–15.5)
IMM GRANULOCYTES # BLD AUTO: <0.03 K/UL (ref 0–0.58)
IMM GRANULOCYTES NFR BLD: 0 % (ref 0–5)
LYMPHOCYTES NFR BLD: 2 K/UL (ref 1.5–4)
LYMPHOCYTES RELATIVE PERCENT: 29 % (ref 20–42)
MCH RBC QN AUTO: 31.2 PG (ref 26–35)
MCHC RBC AUTO-ENTMCNC: 33 G/DL (ref 32–34.5)
MCV RBC AUTO: 94.6 FL (ref 80–99.9)
MONOCYTES NFR BLD: 0.54 K/UL (ref 0.1–0.95)
MONOCYTES NFR BLD: 8 % (ref 2–12)
NEUTROPHILS NFR BLD: 59 % (ref 43–80)
NEUTS SEG NFR BLD: 4.02 K/UL (ref 1.8–7.3)
PLATELET # BLD AUTO: 233 K/UL (ref 130–450)
PMV BLD AUTO: 10.3 FL (ref 7–12)
POTASSIUM SERPL-SCNC: 3.9 MMOL/L (ref 3.5–5.1)
PROT SERPL-MCNC: 6.9 G/DL (ref 6.4–8.3)
RBC # BLD AUTO: 4.1 M/UL (ref 3.5–5.5)
SODIUM SERPL-SCNC: 140 MMOL/L (ref 136–145)
WBC OTHER # BLD: 6.9 K/UL (ref 4.5–11.5)

## 2025-08-12 PROCEDURE — 80053 COMPREHEN METABOLIC PANEL: CPT

## 2025-08-12 PROCEDURE — 85025 COMPLETE CBC W/AUTO DIFF WBC: CPT

## 2025-08-12 PROCEDURE — 36415 COLL VENOUS BLD VENIPUNCTURE: CPT

## 2025-08-13 ENCOUNTER — OFFICE VISIT (OUTPATIENT)
Dept: ONCOLOGY | Age: 70
End: 2025-08-13

## 2025-08-13 VITALS
WEIGHT: 169.4 LBS | OXYGEN SATURATION: 100 % | DIASTOLIC BLOOD PRESSURE: 63 MMHG | SYSTOLIC BLOOD PRESSURE: 141 MMHG | HEART RATE: 62 BPM | TEMPERATURE: 97.1 F | HEIGHT: 63 IN | BODY MASS INDEX: 30.02 KG/M2

## 2025-08-13 DIAGNOSIS — C50.912 STAGE 1 BREAST CANCER, ER+, LEFT (HCC): Primary | ICD-10-CM

## 2025-08-13 DIAGNOSIS — Z17.0 STAGE 1 BREAST CANCER, ER+, LEFT (HCC): Primary | ICD-10-CM

## (undated) DEVICE — ELECTRODE PT RET AD L9FT HI MOIST COND ADH HYDRGEL CORDED

## (undated) DEVICE — LIQUIBAND RAPID ADHESIVE 36/CS 0.8ML: Brand: MEDLINE

## (undated) DEVICE — STERILE POLYISOPRENE POWDER-FREE SURGICAL GLOVES: Brand: PROTEXIS

## (undated) DEVICE — Device

## (undated) DEVICE — PLASMABLADE PS210-030S 3.0S LOCK: Brand: PLASMABLADE™

## (undated) DEVICE — BRA SURGICALXL FULL SUPP SFT CUP FR CLSR ADJ STRP

## (undated) DEVICE — BLADE,STAINLESS-STEEL,10,STRL,DISPOSABLE: Brand: MEDLINE

## (undated) DEVICE — BANDAGE,GAUZE,4.5"X4.1YD,STERILE,LF: Brand: MEDLINE

## (undated) DEVICE — TOWEL,OR,DSP,ST,BLUE,STD,6/PK,12PK/CS: Brand: MEDLINE

## (undated) DEVICE — TUBING, SUCTION, 3/16" X 12', STRAIGHT: Brand: MEDLINE

## (undated) DEVICE — DRAPE,REIN 53X77,STERILE: Brand: MEDLINE

## (undated) DEVICE — STRAP POS MP 30X3 IN HK LOOP CLOSURE FOAM DISP

## (undated) DEVICE — AGENT HEMSTAT 5GM ARISTA AH

## (undated) DEVICE — SOLUTION IV 1000ML LAC RINGERS PH 6.5 INJ USP VIAFLX PLAS

## (undated) DEVICE — SYRINGE MED 10ML TRNSLUC BRL PLUNG BLK MRK POLYPR CTRL

## (undated) DEVICE — 3M™ STERI-STRIP™ REINFORCED ADHESIVE SKIN CLOSURES, R1548, 1 IN X 5 IN (25 MM X 125 MM), 4 STRIPS/ENVELOPE: Brand: 3M™ STERI-STRIP™

## (undated) DEVICE — WIPES SKIN CLOTH READYPREP 9 X 10.5 IN 2% CHLORHEX GLUCONATE CHG PREOP

## (undated) DEVICE — UNIVERSAL DRAPE: Brand: MEDLINE INDUSTRIES, INC.

## (undated) DEVICE — SPONGE LAP W18XL18IN WHT COT 4 PLY FLD STRUNG RADPQ DISP ST 2 PER PACK

## (undated) DEVICE — STAPLER SKIN SQ 30 ABSRB STPL DISP INSORB ORDER VIA PHONE OR EMAIL

## (undated) DEVICE — NEEDLE HYPO 27GA L15IN REG BVL W O SFTY FOR SYR DISPOSABLE

## (undated) DEVICE — TUBING SUCTION 15 FRX3 MM 32 CM SIL